# Patient Record
Sex: FEMALE | Race: WHITE | NOT HISPANIC OR LATINO | Employment: OTHER | URBAN - NONMETROPOLITAN AREA
[De-identification: names, ages, dates, MRNs, and addresses within clinical notes are randomized per-mention and may not be internally consistent; named-entity substitution may affect disease eponyms.]

---

## 2017-03-06 ENCOUNTER — HISTORY (OUTPATIENT)
Dept: FAMILY MEDICINE | Facility: OTHER | Age: 47
End: 2017-03-06

## 2017-03-06 ENCOUNTER — OFFICE VISIT - GICH (OUTPATIENT)
Dept: FAMILY MEDICINE | Facility: OTHER | Age: 47
End: 2017-03-06

## 2017-03-06 DIAGNOSIS — L73.9 FOLLICULAR DISORDER: ICD-10-CM

## 2017-03-29 ENCOUNTER — COMMUNICATION - GICH (OUTPATIENT)
Dept: FAMILY MEDICINE | Facility: OTHER | Age: 47
End: 2017-03-29

## 2017-03-29 ENCOUNTER — OFFICE VISIT - GICH (OUTPATIENT)
Dept: FAMILY MEDICINE | Facility: OTHER | Age: 47
End: 2017-03-29

## 2017-03-29 ENCOUNTER — HISTORY (OUTPATIENT)
Dept: FAMILY MEDICINE | Facility: OTHER | Age: 47
End: 2017-03-29

## 2017-03-29 DIAGNOSIS — R21 RASH AND OTHER NONSPECIFIC SKIN ERUPTION: ICD-10-CM

## 2017-04-02 ENCOUNTER — HISTORY (OUTPATIENT)
Dept: FAMILY MEDICINE | Facility: OTHER | Age: 47
End: 2017-04-02

## 2017-05-15 ENCOUNTER — OFFICE VISIT - GICH (OUTPATIENT)
Dept: FAMILY MEDICINE | Facility: OTHER | Age: 47
End: 2017-05-15

## 2017-05-15 ENCOUNTER — HISTORY (OUTPATIENT)
Dept: FAMILY MEDICINE | Facility: OTHER | Age: 47
End: 2017-05-15

## 2017-05-15 DIAGNOSIS — N60.02 SOLITARY CYST OF LEFT BREAST: ICD-10-CM

## 2017-05-23 ENCOUNTER — HOSPITAL ENCOUNTER (OUTPATIENT)
Dept: RADIOLOGY | Facility: OTHER | Age: 47
End: 2017-05-23
Attending: FAMILY MEDICINE

## 2017-05-23 ENCOUNTER — AMBULATORY - GICH (OUTPATIENT)
Dept: SCHEDULING | Facility: OTHER | Age: 47
End: 2017-05-23

## 2017-05-23 DIAGNOSIS — N60.02 SOLITARY CYST OF LEFT BREAST: ICD-10-CM

## 2017-05-26 ENCOUNTER — COMMUNICATION - GICH (OUTPATIENT)
Dept: FAMILY MEDICINE | Facility: OTHER | Age: 47
End: 2017-05-26

## 2018-01-03 NOTE — NURSING NOTE
Patient Information     Patient Name MRN Sex Ailin Kidd [id number] Female 1970      Nursing Note by Danielle Pope at 3/6/2017  1:30 PM     Author:  Danielle Pope Service:  (none) Author Type:  (none)     Filed:  3/6/2017  1:38 PM Encounter Date:  3/6/2017 Status:  Signed     :  Danielle Pope            Patient presents today with a rash on her chest.  Danielle Pope LPN  3/6/2017  1:26 PM

## 2018-01-03 NOTE — PROGRESS NOTES
Patient Information     Patient Name MRN Sex Ailin Kidd [id number] Female 1970      Progress Notes by Damien Salmon MD at 3/6/2017  1:30 PM     Author:  Damien Salmon MD Service:  (none) Author Type:  Physician     Filed:  3/6/2017  2:13 PM Encounter Date:  3/6/2017 Status:  Signed     :  Damien Salmon MD (Physician)            SUBJECTIVE:  Ailin Martinez is a 46 y.o. female who presents for evaluation of a rash on her chest. This started to develop on the return flight following a trip to [place]. Temperatures there were very hot and she was sweating quite a bit while there. Rash started about 2 weeks ago on the upper chest and has migrated down between her breasts. Rash is quite pruritic.     No Known Allergies and   No current outpatient prescriptions on file prior to visit.     No current facility-administered medications on file prior to visit.        OBJECTIVE:  /60  Temp 98  F (36.7  C) (Temporal)  Wt 62.8 kg (138 lb 8 oz)  BMI 21.74 kg/m2  EXAM:  General Appearance: Pleasant, alert, appropriate appearance for age. No acute distress  Skin: Multiple inflamed excoriations noted primarily between the breasts.     ASSESSMENT/Plan :      ICD-10-CM    1. Folliculitis  Findings consistent with dermatitis complicated by secondary bacterial infection. Elected to place on oral cephalexin as well as topical triamcinolone cream. She will follow-up if not improving.  L73.9 cephalexin (KEFLEX) 500 mg capsule      triamcinolone (ARISTOCORT; KENALOG) 0.1 % cream       Damien Salmon MD

## 2018-01-04 NOTE — TELEPHONE ENCOUNTER
Patient Information     Patient Name MRAilin Feliz [id number] Female 1970      Telephone Encounter by Tish Daniels at 3/29/2017  1:45 PM     Author:  Tish Daniels Service:  (none) Author Type:  (none)     Filed:  3/29/2017  1:47 PM Encounter Date:  3/29/2017 Status:  Signed     :  Tish Daniels            Patient states she had a recent infection on her neck and chest.  She was given Keflex and this cleared up and with in the last couple days the Rash has come back on her torso area and it is exactly like her previous rash.  She is wondering if she needs to come in again or if another Prescription  Can be sent in to her pharmacy  Tish Daniels LPN........................3/29/2017  1:47 PM

## 2018-01-04 NOTE — TELEPHONE ENCOUNTER
Patient Information     Patient Name MRN Sex Ailin Kidd [id number] Female 1970      Telephone Encounter by Juan David Wen MD at 3/29/2017  1:50 PM     Author:  Juan David Wen MD Service:  (none) Author Type:  Physician     Filed:  3/29/2017  1:50 PM Encounter Date:  3/29/2017 Status:  Signed     :  Juan David Wen MD (Physician)            Needs to be seen

## 2018-01-04 NOTE — TELEPHONE ENCOUNTER
Patient Information     Patient Name MRN Sex Ailin Kidd [id number] Female 1970      Telephone Encounter by Tish Daniels at 3/29/2017  1:55 PM     Author:  Tish Daniels Service:  (none) Author Type:  (none)     Filed:  3/29/2017  1:55 PM Encounter Date:  3/29/2017 Status:  Signed     :  Tish Daniels            Patient notified transferred to rachelle Daniels LPN........................3/29/2017  1:55 PM

## 2018-01-04 NOTE — NURSING NOTE
Patient Information     Patient Name MRN Sex Ailin Kidd [id number] Female 1970      Nursing Note by Lissa Sotomayor at 3/29/2017  4:00 PM     Author:  Lissa Sotomayor Service:  (none) Author Type:  (none)     Filed:  3/29/2017  4:30 PM Encounter Date:  3/29/2017 Status:  Signed     :  Lissa Sotomayor            Patient here for c/o continued rash. Got bacterial infection; was in [place]. Was originally on chest, now more on abdomen. Given antibiotic and cream. Still using the cream but not doing much. Burning and itching.  Lissa Sotomayor LPN..............................3/29/2017  4:05 PM

## 2018-01-04 NOTE — PROGRESS NOTES
Patient Information     Patient Name MRN Sex Ailin Kidd [id number] Female 1970      Progress Notes by Sandy Munroe MD at 3/29/2017  4:00 PM     Author:  Sandy Munroe MD Service:  (none) Author Type:  Physician     Filed:  4/3/2017 12:09 AM Encounter Date:  3/29/2017 Status:  Signed     :  Sandy Munroe MD (Physician)            SUBJECTIVE:    Ailin Martinez is a 46 y.o. female who presents for evaluation of rash.    Nursing Notes:   Lissa Sotomayor  3/29/2017  4:30 PM  Signed  Patient here for c/o continued rash. Got bacterial infection; was in [place]. Was originally on chest, now more on abdomen. Given antibiotic and cream. Still using the cream but not doing much. Burning and itching.  Lissa Sotomayor LPN..............................3/29/2017  4:05 PM    HPI  Ailin Martinez is a 46 y.o. female in for evaluation of rash.  Started when she was on the plane back from [place].  It started on her upper chest and now is on her abdomen.  She had thought it could be shingles, so came into the clinic.  Diagnosis was possible bacterial skin infection.  Was placed on kelfex tid - symptoms started to improve with taking this.  Also given TMC - this hasn't helped much.  No fevers.  Some itching.  No peeling of her skin.  No one else with similar symptoms.        No Known Allergies,   Current Outpatient Prescriptions     Medication  Sig     triamcinolone (ARISTOCORT; KENALOG) 0.1 % cream Apply  topically to affected area(s) 3 times daily.     No current facility-administered medications for this visit.      Medications have been reviewed by me and are current to the best of my knowledge and ability.  and   Past Medical History:     Diagnosis  Date     Hx of pregnancy      with two vaginal deliveries        REVIEW OF SYSTEMS:  Review of Systems   Constitutional: Negative for chills and fever.   Respiratory: Negative for cough.        OBJECTIVE:  /60  Pulse 72   Wt 62.8 kg (138 lb 6.4 oz)  BMI 21.72 kg/m2    EXAM:   Physical Exam   Constitutional: She is well-developed, well-nourished, and in no distress.   Skin:   Erythematous flat macules with thin scale, no vesicles across her abdomen - none on her arms/legs or back   Nursing note and vitals reviewed.      ASSESSMENT/PLAN:    ICD-10-CM    1. Rash R21 trimethoprim-sulfamethoxazole, 160-800 mg, (BACTRIM DS, SEPTRA DS) tablet        Plan:  1.  Uncertain if rash is contact infection, fungal, or bacterial.  She did have some response to keflex, so will restart treatment with bactrim for 10 days - if not improving consider use of antifungal.  It is possible that her rash is pityriasis, possible that is not related to recent travel.  Follow up if not improving.  Sandy Munroe MD

## 2018-01-05 NOTE — TELEPHONE ENCOUNTER
Patient Information     Patient Name MRN Ailin Gotti [id number] Female 1970      Telephone Encounter by Deandra Bonner at 2017  2:22 PM     Author:  Deandra Bonner Service:  (none) Author Type:  (none)     Filed:  2017  2:27 PM Encounter Date:  2017 Status:  Signed     :  Deandra Bonner            Patient calling in regards to results from aspiration of breast cysts. She would like another provider to look at them so she doesn't have to wait over the long weekend.    Deandra Bonner LPN...................2017  2:27 PM

## 2018-01-05 NOTE — MISCELLANEOUS
Patient Information     Patient Name MRN Sex Ailin Kidd [id number] Female 1970      Protocol by Evelina Bynum RN at 2017  1:46 PM     Author:  Evelina Bynum RN Service:  (none) Author Type:  NURS- Registered Nurse     Filed:  2017  1:48 PM Date of Service:  2017  1:46 PM Status:  Signed     :  Evelina Bynum RN (NURS- Registered Nurse)            Universal Protocol-1346    A. Pre-procedure verification complete yes  1-relevant information / documentation available, reviewed and properly matched to the patient; 2-consent accurate and complete, 3-equipment and supplies available    B. Site marking complete Yes  Site marked if not in continuous attendance with patient    C. TIME OUT completed yes  Time Out was conducted just prior to starting procedure to verify the eight required elements: 1-patient identity, 2-consent accurate and complete, 3-position, 4-correct side/site marked (if applicable), 5-procedure, 6-relevant images / results properly labeled and displayed (if applicable), 7-antibiotics / irrigation fluids (if applicable), 8-safety precautions.

## 2018-01-05 NOTE — PROGRESS NOTES
Patient Information     Patient Name MRN Sex Ailin Kidd [id number] Female 1970      Progress Notes by Evelina Bynum RN at 2017  2:15 PM     Author:  Evelina Bynum RN Service:  (none) Author Type:  NURS- Registered Nurse     Filed:  2017  2:26 PM Date of Service:  2017  2:15 PM Status:  Signed     :  Evelina Bynum RN (NURS- Registered Nurse)            Discharge Note    Data:  Ailin Martinez has been discharged home at 1415 via ambulatory accompanied by Registered Nurse.      Action:  Written discharge/follow-up instructions were provided to patient. Prescriptions : None.  Belongings sent with patient. Medications from home sent with patient/family: Not Applicable  Equipment none .     Response:  Patient verbalized understanding of discharge instructions, reason for discharge, and necessary follow-up appointments.

## 2018-01-05 NOTE — TELEPHONE ENCOUNTER
Patient Information     Patient Name MRN Sex Ailin Kidd [id number] Female 1970      Telephone Encounter by Deandra Bonner at 2017  2:44 PM     Author:  Deandra Bonner Service:  (none) Author Type:  (none)     Filed:  2017  2:45 PM Encounter Date:  2017 Status:  Signed     :  Deandra Bonner            Spoke with patient and notified her that results were normal. She was relieved.    Deandra Bonner LPN...................2017  2:45 PM

## 2018-01-05 NOTE — TELEPHONE ENCOUNTER
Patient Information     Patient Name MRN Ailin Gotti [id number] Female 1970      Telephone Encounter by Juan David Wen MD at 2017  2:36 PM     Author:  Juan David Wen MD Service:  (none) Author Type:  Physician     Filed:  2017  2:37 PM Encounter Date:  2017 Status:  Signed     :  Juan David Wen MD (Physician)            Will see patient today

## 2018-01-05 NOTE — PROGRESS NOTES
"Patient Information     Patient Name MRN Sex Ailin Kidd [id number] Female 1970      Progress Notes by Damien Salmon MD at 5/15/2017  2:45 PM     Author:  Damien Salmon MD Service:  (none) Author Type:  Physician     Filed:  5/15/2017  4:03 PM Encounter Date:  5/15/2017 Status:  Signed     :  Damien Salmon MD (Physician)            SUBJECTIVE:  Ailin Martinez is a 46 y.o. female who presents with a painful lump in the lateral aspect of her left breast that has been present for a couple of weeks. She had a mammogram and bilateral breast ultrasound late last year for right breast pain with fibrocystic changes and bilateral cysts noted at that time. 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    No Known Allergies and   No current outpatient prescriptions on file prior to visit.     No current facility-administered medications on file prior to visit.        OBJECTIVE:  /68  Temp 98.2  F (36.8  C) (Temporal)  Ht 1.708 m (5' 7.25\")  Wt 61.7 kg (136 lb)  BMI 21.14 kg/m2  EXAM:  General Appearance: Pleasant, alert, appropriate appearance for age. No acute distress  Breast Exam: Visible lump noted in the left breast. Breast exam reveals fibrocystic changes in the right breast laterally and a complex cyst like mass in the lateral aspect of the left breast measuring 3-4 cm in maximal diameter. This region is tender to touch. No palpable axillary adenopathy.    ASSESSMENT/Plan :      ICD-10-CM    1. Breast cyst, left  Will refer to radiology for ultrasound-guided left breast cyst aspiration.  N60.02 US BREAST CYST ASPIRATION       Damien Salmon MD          "

## 2018-01-05 NOTE — NURSING NOTE
Patient Information     Patient Name MRN Sex Ailin Kidd [id number] Female 1970      Nursing Note by Danielle Pope at 5/15/2017  2:45 PM     Author:  Danielle Pope Service:  (none) Author Type:  (none)     Filed:  5/15/2017  3:03 PM Encounter Date:  5/15/2017 Status:  Signed     :  Danielle Pope            Patient presents today as a follow-up on breast cysts.  She states that the left side of her breast, the cysts are getting bigger and they are painful.  Danielle Pope LPN  5/15/2017  2:58 PM

## 2018-01-05 NOTE — PROGRESS NOTES
Patient Information     Patient Name MRN Sex Ailin Kidd [id number] Female 1970      Progress Notes by Evelina Bynum RN at 2017  2:05 PM     Author:  Evelina Bynum RN  Service:  (none) Author Type:  NURS- Registered Nurse     Filed:  2017  2:27 PM  Date of Service:  2017  2:05 PM Status:  Addendum     :  Evelina Bynum RN (NURS- Registered Nurse)        Related Notes: Original Note by Evelina Bynum RN (NURS- Registered Nurse) filed at 2017  2:25 PM            Pressure held on aspiration site for 5 minutes. Sterile 2x2 placed over insertion site and covered with a sterile tegaderm.      Sports bra and small ice pad in place over dressing.    Aspiration fluid sent to lab for analysis.  Patient will contact PMD if she does not hear from them in 3 days.

## 2018-01-26 VITALS
TEMPERATURE: 98 F | WEIGHT: 138.5 LBS | BODY MASS INDEX: 21.74 KG/M2 | DIASTOLIC BLOOD PRESSURE: 60 MMHG | SYSTOLIC BLOOD PRESSURE: 110 MMHG

## 2018-01-26 VITALS
DIASTOLIC BLOOD PRESSURE: 68 MMHG | TEMPERATURE: 98.2 F | BODY MASS INDEX: 21.35 KG/M2 | WEIGHT: 136 LBS | SYSTOLIC BLOOD PRESSURE: 120 MMHG | HEIGHT: 67 IN

## 2018-01-26 VITALS
DIASTOLIC BLOOD PRESSURE: 60 MMHG | HEART RATE: 72 BPM | SYSTOLIC BLOOD PRESSURE: 100 MMHG | BODY MASS INDEX: 21.68 KG/M2 | WEIGHT: 138.4 LBS

## 2018-02-19 ENCOUNTER — DOCUMENTATION ONLY (OUTPATIENT)
Dept: FAMILY MEDICINE | Facility: OTHER | Age: 48
End: 2018-02-19

## 2018-02-19 PROBLEM — T78.40XA ALLERGIC STATE: Status: ACTIVE | Noted: 2018-02-19

## 2018-02-19 PROBLEM — N94.6 DYSMENORRHEA: Status: ACTIVE | Noted: 2018-02-19

## 2018-02-20 ENCOUNTER — OFFICE VISIT (OUTPATIENT)
Dept: FAMILY MEDICINE | Facility: OTHER | Age: 48
End: 2018-02-20
Attending: FAMILY MEDICINE
Payer: COMMERCIAL

## 2018-02-20 VITALS
HEIGHT: 67 IN | HEART RATE: 80 BPM | DIASTOLIC BLOOD PRESSURE: 74 MMHG | BODY MASS INDEX: 20.21 KG/M2 | WEIGHT: 128.8 LBS | SYSTOLIC BLOOD PRESSURE: 106 MMHG

## 2018-02-20 DIAGNOSIS — N60.02 BREAST CYST, LEFT: Primary | ICD-10-CM

## 2018-02-20 DIAGNOSIS — N64.4 MASTODYNIA: ICD-10-CM

## 2018-02-20 PROBLEM — N94.6 DYSMENORRHEA: Status: RESOLVED | Noted: 2018-02-19 | Resolved: 2018-02-20

## 2018-02-20 PROCEDURE — 99213 OFFICE O/P EST LOW 20 MIN: CPT | Performed by: FAMILY MEDICINE

## 2018-02-20 ASSESSMENT — PAIN SCALES - GENERAL: PAINLEVEL: MILD PAIN (2)

## 2018-02-20 NOTE — MR AVS SNAPSHOT
"              After Visit Summary   2/20/2018    Ailin Martinez    MRN: [id number]           Patient Information     Date Of Birth          1970        Visit Information        Provider Department      2/20/2018 7:45 AM Steve Chen MD [facility]        Today's Diagnoses     Breast cyst, left    -  1    Mastodynia           Follow-ups after your visit        Future tests that were ordered for you today     Open Future Orders        Priority Expected Expires Ordered    US Breast Left Complete 4 Quadrants Routine  2/20/2019 2/20/2018    US Breast Cyst Aspiration ([facility]) Routine  2/20/2019 2/20/2018    MA Diagnostic Digital Bilateral Routine  2/20/2019 2/20/2018            Who to contact     If you have questions or need follow up information about today's clinic visit or your schedule please contact [facility] directly at [phone].  Normal or non-critical lab and imaging results will be communicated to you by MyChart, letter or phone within 4 business days after the clinic has received the results. If you do not hear from us within 7 days, please contact the clinic through MyChart or phone. If you have a critical or abnormal lab result, we will notify you by phone as soon as possible.  Submit refill requests through [employer] or call your pharmacy and they will forward the refill request to us. Please allow 3 business days for your refill to be completed.          Additional Information About Your Visit        MyChart Information     [employer] lets you send messages to your doctor, view your test results, renew your prescriptions, schedule appointments and more. To sign up, go to [email or web address] . Click on \"Log in\" on the left side of the screen, which will take you to the Welcome page. Then click on \"Sign up Now\" on the right side of the page.     You will be asked to enter the access code listed below, as well as some personal information. Please follow the " "directions to create your username and password.     Your access code is: [email or web address]  Expires: 2018  8:26 AM     Your access code will  in 90 days. If you need help or a new code, please call your [facility] clinic or [phone].        Care EveryWhere ID     This is your Care EveryWhere ID. This could be used by other organizations to access your [facility] medical records  [id number]        Your Vitals Were     Pulse Height Breastfeeding? BMI (Body Mass Index)          80 5' 7\" (1.702 m) No 20.17 kg/m2         Blood Pressure from Last 3 Encounters:   18 106/74   05/15/17 120/68   17 100/60    Weight from Last 3 Encounters:   18 128 lb 12.8 oz (58.4 kg)   05/15/17 136 lb (61.7 kg)   17 138 lb 6.4 oz (62.8 kg)               Primary Care Provider Fax #    Physician No Ref-Primary [phone]       No address on file        Equal Access to Services     [facility]: Hadii aad ku hadasho Sodouglas, waaxda luqadaha, qaybta kaalmada adestephanie, jeff renee . So [facility] [phone].    ATENCIÓN: Si habla español, tiene a cedeño disposición servicios gratuitos de asistencia lingüística. Llame al [phone].    We comply with applicable federal civil rights laws and [place] laws. We do not discriminate on the basis of race, color, national origin, age, disability, sex, sexual orientation, or gender identity.            Thank you!     Thank you for choosing [facility] AND [facility]  for your care. Our goal is always to provide you with excellent care. Hearing back from our patients is one way we can continue to improve our services. Please take a few minutes to complete the written survey that you may receive in the mail after your visit with us. Thank you!             Your Updated Medication List - Protect others around you: Learn how to safely use, store and throw away your medicines at www.disposemymeds.org.      Notice  As of 2018  8:26 AM    You " have not been prescribed any medications.

## 2018-02-20 NOTE — PROGRESS NOTES
Nursing Notes:   Berenice Whitman LPN  2018  7:54 AM  Signed  She has a new lump in her left breast and a lot of left breast pain.  Berenice Whitman LPN..................2018   7:49 AM      SUBJECTIVE:  Ailin Martinez  is a 47 year old female who comes in today for evaluation of a lump in her left breast abdomen increased amount of left breast pain.  She has a history in the past fibrocystic changes and saw Dr. Salmon back in May of last year with similar symptoms.  She was referred for ultrasound-guided breast cyst aspiration.  Pathology was normal.  There was a cyst on the left under the nipple that she could not get at. She is having pain in her left armpit.  She gets sore from rubbing.    She drinks some coffee, not much pop.  No fever, chills.     Past Medical, Family, and Social History reviewed and updated as noted below.   ROS is negative except as noted above       No Known Allergies,   Family History   Problem Relation Age of Onset     Family History Negative Mother      Good Health     Family History Negative Father      Good Health     Breast Cancer No family hx of      Cancer-breast   ,   No current outpatient prescriptions on file.     No current facility-administered medications for this visit.    ,   Past Medical History:   Diagnosis Date     Personal history of other medical treatment (CODE)      with two vaginal deliveries   ,   Patient Active Problem List    Diagnosis Date Noted     Allergic state 2018     Priority: Medium     Onychomycosis 2016     Priority: Medium   ,   Past Surgical History:   Procedure Laterality Date     EXTRACTION(S) DENTAL      No Comments Provided     LAPAROSCOPIC ABLATION ENDOMETRIOSIS      ,Endometrial Ablation     OTHER SURGICAL HISTORY      ,,OTHER    and   Social History   Substance Use Topics     Smoking status: Never Smoker     Smokeless tobacco: Never Used     Alcohol use No     OBJECTIVE:  /74 (BP Location:  "Right arm, Patient Position: Sitting, Cuff Size: Adult Regular)  Pulse 80  Ht 5' 7\" (1.702 m)  Wt 128 lb 12.8 oz (58.4 kg)  Breastfeeding? No  BMI 20.17 kg/m2   EXAM:  Alert and cooperative, no distress.  Thin white female.  Examination of right breast reveals fibrocystic changes but no suspicious masses or tenderness noted.  No axillary nodes.  Examination of the left breast reveals a slightly tender blueberry sized cyst at about 2:00 that is mobile.  There is some stranding in fibrous tissue that heads towards the axilla along with same line.  It is slightly tender to palpation there.  There is no redness or swelling.  No palpable nodes.  ASSESSMENT/Plan :    Ailin was seen today for mass.    Diagnoses and all orders for this visit:    Breast cyst, left  -     MA Diagnostic Digital Bilateral; Future  -     US Breast Left Complete 4 Quadrants; Future  -     US Breast Cyst Aspiration ([facility]); Future    Mastodynia  -     MA Diagnostic Digital Bilateral; Future  -     US Breast Left Complete 4 Quadrants; Future  -     US Breast Cyst Aspiration ([facility]); Future      Referred for diagnostic mammogram and ultrasound.  Also referred for breast cyst aspiration because of the symptomatic.  Encouraged limitation of caffeine intake.    Steve Chen MD              "

## 2018-02-20 NOTE — NURSING NOTE
She has a new lump in her left breast and a lot of left breast pain.  Berenice Whitman LPN..................2/20/2018   7:49 AM

## 2018-02-21 ASSESSMENT — PATIENT HEALTH QUESTIONNAIRE - PHQ9: SUM OF ALL RESPONSES TO PHQ QUESTIONS 1-9: 2

## 2018-02-27 ENCOUNTER — HOSPITAL ENCOUNTER (OUTPATIENT)
Dept: ULTRASOUND IMAGING | Facility: OTHER | Age: 48
Discharge: HOME | End: 2018-02-27
Attending: FAMILY MEDICINE | Admitting: FAMILY MEDICINE
Payer: COMMERCIAL

## 2018-02-27 ENCOUNTER — HOSPITAL ENCOUNTER (OUTPATIENT)
Dept: MAMMOGRAPHY | Facility: OTHER | Age: 48
End: 2018-02-27
Attending: FAMILY MEDICINE
Payer: COMMERCIAL

## 2018-02-27 DIAGNOSIS — N60.02 BREAST CYST, LEFT: ICD-10-CM

## 2018-02-27 DIAGNOSIS — N64.4 MASTODYNIA: ICD-10-CM

## 2018-02-27 PROCEDURE — 76642 ULTRASOUND BREAST LIMITED: CPT | Mod: LT

## 2018-02-27 PROCEDURE — 77066 DX MAMMO INCL CAD BI: CPT

## 2018-03-07 ENCOUNTER — HOSPITAL ENCOUNTER (OUTPATIENT)
Dept: ULTRASOUND IMAGING | Facility: OTHER | Age: 48
Discharge: HOME | End: 2018-03-07
Attending: FAMILY MEDICINE | Admitting: FAMILY MEDICINE
Payer: COMMERCIAL

## 2018-03-07 VITALS
HEART RATE: 95 BPM | SYSTOLIC BLOOD PRESSURE: 107 MMHG | OXYGEN SATURATION: 98 % | RESPIRATION RATE: 16 BRPM | DIASTOLIC BLOOD PRESSURE: 82 MMHG | TEMPERATURE: 99.2 F

## 2018-03-07 DIAGNOSIS — N60.02 BREAST CYST, LEFT: ICD-10-CM

## 2018-03-07 DIAGNOSIS — N64.4 MASTODYNIA: ICD-10-CM

## 2018-03-07 PROCEDURE — 19000 PUNCTURE ASPIR CYST BREAST: CPT

## 2018-03-07 PROCEDURE — 25000125 ZZHC RX 250: Performed by: RADIOLOGY

## 2018-03-07 RX ADMIN — LIDOCAINE HYDROCHLORIDE 10 ML: 10 INJECTION, SOLUTION INFILTRATION; PERINEURAL at 12:08

## 2018-03-07 NOTE — DISCHARGE INSTRUCTIONS
"NEEDLE BIOPSY BREAST    Activity: Rest the remainder of the day. You may resume normal activity after the next day. Avoid any vigorous/strenuous physical activity for 24 hours.    Comfort: If you have discomfort or tenderness at the site you may take your usual or recommended pain medication. Do not take aspirin the day of the procedure or for 48 hours following the biopsy.    Diet: You may resume your usual diet.    Care of site: Leave ice pack in place for 4 hours, or until it is no longer cold. The ice pack is reusable and may be refrozen.  Keep your bra and the dressing on for 24 hours. Then you may remove the bandage and shower. If there are steri-strips you may remove them in 3 to 5 days.  You may have some discomfort and a small amount of bruising where the biopsy was performed. This is normal. For several days or even a couple of weeks, you may have tenderness or \"twinges\" and a tiny bump where the needle went into the skin. This can be bothersome, but is not abnormal. You can use warm moist washcloths, as this may help. Do Not Use A Heating Pad.    RETURN TO THE EMERGENCY ROOM FOR:   Shortness of breath   Rapid heart rate   If pain becomes worse    Call Your Doctor For:    A fever over 101 degrees   Increased redness, increased swelling, and/or persistent drainage/discomfort  around the site    Other: At the end of your breast biopsy, a tiny titanium clip will be inserted through the biopsy needle and placed at the biopsy site within your breast. The marker provides a landmark of the biopsy for further mammograms or surgical procedures. This marker is MRI compatible and poses no known health risks.    You will be receiving a letter in the mail from [facility] Department with your biopsy results.  In 6 months a mammogram will be needed to establish a new baseline and to recheck the area where the biopsy occurred. Our radiology department will call you to schedule an appointment.    For " questions, problems or concerns, contact the Radiology Department at [phone].

## 2018-03-07 NOTE — IP AVS SNAPSHOT
"                  MRN:[id number]                      After Visit Summary   3/7/2018    Ailin Martinez    MRN: [id number]           Visit Information        Provider Department      3/7/2018 11:30 AM EVETTERAD1;  IMAGING NURSE; [facility]           Review of your medicines      Notice     You have not been prescribed any medications.             Protect others around you: Learn how to safely use, store and throw away your medicines at www.disposemymeds.org.         Follow-ups after your visit         Care Instructions        Further instructions from your care team       NEEDLE BIOPSY BREAST    Activity: Rest the remainder of the day. You may resume normal activity after the next day. Avoid any vigorous/strenuous physical activity for 24 hours.    Comfort: If you have discomfort or tenderness at the site you may take your usual or recommended pain medication. Do not take aspirin the day of the procedure or for 48 hours following the biopsy.    Diet: You may resume your usual diet.    Care of site: Leave ice pack in place for 4 hours, or until it is no longer cold. The ice pack is reusable and may be refrozen.  Keep your bra and the dressing on for 24 hours. Then you may remove the bandage and shower. If there are steri-strips you may remove them in 3 to 5 days.  You may have some discomfort and a small amount of bruising where the biopsy was performed. This is normal. For several days or even a couple of weeks, you may have tenderness or \"twinges\" and a tiny bump where the needle went into the skin. This can be bothersome, but is not abnormal. You can use warm moist washcloths, as this may help. Do Not Use A Heating Pad.    RETURN TO THE EMERGENCY ROOM FOR:   Shortness of breath   Rapid heart rate   If pain becomes worse    Call Your Doctor For:    A fever over 101 degrees   Increased redness, increased swelling, and/or persistent drainage/discomfort  around the site    Other: At the end of " "your breast biopsy, a tiny titanium clip will be inserted through the biopsy needle and placed at the biopsy site within your breast. The marker provides a landmark of the biopsy for further mammograms or surgical procedures. This marker is MRI compatible and poses no known health risks.    You will be receiving a letter in the mail from [facility] Mammography Department with your biopsy results.  In 6 months a mammogram will be needed to establish a new baseline and to recheck the area where the biopsy occurred. Our radiology department will call you to schedule an appointment.    For questions, problems or concerns, contact the Radiology Department at [phone].         Additional Information About Your Visit        [address] [facility] Information     [employer] lets you send messages to your doctor, view your test results, renew your prescriptions, schedule appointments and more. To sign up, go to [email or web address] . Click on \"Log in\" on the left side of the screen, which will take you to the Welcome page. Then click on \"Sign up Now\" on the right side of the page.     You will be asked to enter the access code listed below, as well as some personal information. Please follow the directions to create your username and password.     Your access code is: [email or web address]  Expires: 2018  8:26 AM     Your access code will  in 90 days. If you need help or a new code, please call your [place] clinic or [phone].        Care EveryWhere ID     This is your Care EveryWhere ID. This could be used by other organizations to access your [place] medical records  [id number]        Your Vitals Were     Blood Pressure Pulse Temperature Pulse Oximetry          115/75 (BP Location: Left arm, Cuff Size: Adult Regular) 95 99.2  F (37.3  C) (Tympanic) 99%         Primary Care Provider Fax #    Physician No Ref-Primary [phone]      Equal Access to Services     WHITLEY MAYORGA : ligia Law, " jeff gonzalez dbarchana linotoniel renee ah. So [facility] [phone].    ATENCIÓN: Si habla han, tiene a cedeño disposición servicios gratuitos de asistencia lingüística. Llame al [phone].    We comply with applicable federal civil rights laws and [place] laws. We do not discriminate on the basis of race, color, national origin, age, disability, sex, sexual orientation, or gender identity.            Thank you!     Thank you for choosing [facility] for your care. Our goal is always to provide you with excellent care. Hearing back from our patients is one way we can continue to improve our services. Please take a few minutes to complete the written survey that you may receive in the mail after you visit with us. Thank you!             Medication List: This is a list of all your medications and when to take them. Check marks below indicate your daily home schedule. Keep this list as a reference.      Notice     You have not been prescribed any medications.

## 2018-03-07 NOTE — PROGRESS NOTES
Pressure held on biopsy site for 5 minutes. Sterile 2x2 placed over insertion site and covered with a sterile tegaderm.    Patient brought to mamms for post clip mammogram:  no    Sports bra and small ice pad in place over dressing.    Patient discharged at this time. Instructions reviewed, patient voices understanding.

## 2018-03-07 NOTE — IP AVS SNAPSHOT
[facility]    [address]    [place] [address]    Phone:  [phone]    Fax:  [phone]                                       After Visit Summary   3/7/2018    Ailin Martinez    MRN: [id number]           After Visit Summary Signature Page     I have received my discharge instructions, and my questions have been answered. I have discussed any challenges I see with this plan with the nurse or doctor.    ..........................................................................................................................................  Patient/Patient Representative Signature      ..........................................................................................................................................  Patient Representative Print Name and Relationship to Patient    ..................................................               ................................................  Date                                            Time    ..........................................................................................................................................  Reviewed by Signature/Title    ...................................................              ..............................................  Date                                                            Time

## 2018-06-25 ENCOUNTER — OFFICE VISIT (OUTPATIENT)
Dept: FAMILY MEDICINE | Facility: OTHER | Age: 48
End: 2018-06-25
Attending: NURSE PRACTITIONER
Payer: COMMERCIAL

## 2018-06-25 VITALS
BODY MASS INDEX: 20 KG/M2 | DIASTOLIC BLOOD PRESSURE: 76 MMHG | TEMPERATURE: 99.3 F | HEART RATE: 72 BPM | SYSTOLIC BLOOD PRESSURE: 108 MMHG | HEIGHT: 67 IN | WEIGHT: 127.44 LBS

## 2018-06-25 DIAGNOSIS — S46.211A BICEPS TENDON RUPTURE, PROXIMAL, RIGHT, INITIAL ENCOUNTER: Primary | ICD-10-CM

## 2018-06-25 PROCEDURE — 99213 OFFICE O/P EST LOW 20 MIN: CPT | Performed by: NURSE PRACTITIONER

## 2018-06-25 RX ORDER — TRAMADOL HYDROCHLORIDE 50 MG/1
50 TABLET ORAL EVERY 6 HOURS PRN
Qty: 10 TABLET | Refills: 0 | Status: SHIPPED | OUTPATIENT
Start: 2018-06-25 | End: 2022-11-01

## 2018-06-25 RX ORDER — IBUPROFEN 600 MG/1
600 TABLET, FILM COATED ORAL EVERY 8 HOURS PRN
Qty: 45 TABLET | Refills: 0 | Status: SHIPPED | OUTPATIENT
Start: 2018-06-25 | End: 2022-11-01

## 2018-06-25 ASSESSMENT — PAIN SCALES - GENERAL: PAINLEVEL: MODERATE PAIN (4)

## 2018-06-25 NOTE — PROGRESS NOTES
Nursing Notes:   Niya Garcia CMA  6/25/2018 11:45 AM  Unsigned  Patient presents to the clinic for right arm pain that started this morning. 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 She reports no known injury and says the area is now swollen and bruised. She has not taken or done anything for treatment.  Niya CORONEL CMA.......6/25/2018..11:44 AM      SUBJECTIVE:   Ailin Martinez is a 47 year old female who presents to clinic today for the following health issues:    Musculoskeletal problem/pain      Duration: woke today with bruise and irritation, did have pain in upper arm last night.     Description  Location: RT upper arm    Intensity:  moderate    Accompanying signs and symptoms: weakness of RT arm, swelling and discoloration of biceps area    History  Previous similar problem: no   Previous evaluation:  none    Precipitating or alleviating factors:  Trauma or overuse: YES-feels she may be overusing her arm as the last 6 weeks she has been painting her her father's house.  She is getting it ready to sell.  So there has been a lot of cleaning overhead use of her arm.  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  Aggravating factors include: lifting    Therapies tried and outcome: rest/inactivity        Problem list and histories reviewed & adjusted, as indicated.  Additional history: as documented    Current Outpatient Prescriptions   Medication Sig Dispense Refill     ibuprofen (ADVIL/MOTRIN) 600 MG tablet Take 1 tablet (600 mg) by mouth every 8 hours as needed for moderate pain 45 tablet 0     order for DME Equipment being ordered: Sling 1 Device 0     traMADol (ULTRAM) 50 MG tablet Take 1 tablet (50 mg) by mouth every 6 hours as needed for severe pain 10 tablet 0     No Known Allergies      ROS:  Notable findings in the HPI.       OBJECTIVE:     /76 (BP Location: Left arm, Patient Position: Sitting, Cuff Size: Adult Regular)  Pulse 72  Temp 99.3  F (37.4  C)  "(Tympanic)  Ht 5' 7\" (1.702 m)  Wt 127 lb 7 oz (57.8 kg)  Breastfeeding? No  BMI 19.96 kg/m2  Body mass index is 19.96 kg/(m^2).  GENERAL: healthy, alert and no distress  MS: RUE exam shows right shoulder with pain at high arc of abduction, negative empty can test, exquisite tenderness over biceps tendon insertion.  Range of motion of elbow intact however is painful for her to do it.  Biceps tendon insertion at the elbow appears intact with a negative hook test.  She does have a bunching of the biceps muscle towards the distal and, and significant bruising around the distal end of the biceps.  She is able to pronate and supinate her forearm without pain.  SKIN: no suspicious lesions or rashes    Diagnostic Test Results:  none     ASSESSMENT/PLAN:     1. Biceps tendon rupture, proximal, right, initial encounter  - ORTHOPEDICS ADULT REFERRAL  - traMADol (ULTRAM) 50 MG tablet; Take 1 tablet (50 mg) by mouth every 6 hours as needed for severe pain  Dispense: 10 tablet; Refill: 0  - ibuprofen (ADVIL/MOTRIN) 600 MG tablet; Take 1 tablet (600 mg) by mouth every 8 hours as needed for moderate pain  Dispense: 45 tablet; Refill: 0  - order for DME; Equipment being ordered: Sling  Dispense: 1 Device; Refill: 0    Medical Decision Making:    Differential Diagnosis:  MS Injury Pain: tendonitis, muscle strain, contusion and distal biceps tendon rupture.    Serious Comorbid Conditions:  Adult:  None    PLAN:    MS Injury/Pain  ice, elevate, rest, Tylenol, Ibuprofen and Rx: Did give her a small prescription for Ultram just for the next few days, she is worried about nausea so she may not take these.  Also gave her prescription for ibuprofen for inflammation.  Given her age and injury we will refer to orthopedics.  I did discuss with her that the proximal tendon rupture is better than the distal tendon rupture.  Given her exam, history, repetitive use I suspect that this is a proximal tendon rupture.  We discussed that these do " not reattach but the pain does get better and use of her arm will get better.    Followup:    If not improving or if condition worsens, follow up with your Primary Care Provider    Disclaimer:  This note consists of words and symbols derived from keyboarding, dictation, or using voice recognition software. As a result, there may be errors in the script that have gone undetected. Please consider this when interpreting information found in this note.      Katherine Hidalgo NP, 6/25/2018 11:59 AM

## 2018-06-25 NOTE — MR AVS SNAPSHOT
After Visit Summary   6/25/2018    Ailin aMrtinez    MRN: [id number]           Patient Information     Date Of Birth          1970        Visit Information        Provider Department      6/25/2018 11:15 AM Katherine Hidalgo NP [facility]        Today's Diagnoses     Biceps tendon rupture, proximal, right, initial encounter    -  1      Care Instructions    See Up to date print out.           Follow-ups after your visit        Additional Services     ORTHOPEDICS ADULT REFERRAL       Your provider has referred you to: G[facility]: [facility] [phone] [email or web address]    Please be aware that coverage of these services is subject to the terms and limitations of your health insurance plan.  Call member services at your health plan with any benefit or coverage questions.      Please bring the following to your appointment:    >>   Any x-rays, CTs or MRIs which have been performed.  Contact the facility where they were done to arrange for  prior to your scheduled appointment.    >>   List of current medications   >>   This referral request   >>   Any documents/labs given to you for this referral                  Your next 10 appointments already scheduled     Jun 26, 2018  3:00 PM CDT   Office Visit with LOUIS Barriga CNP   [facility]    [address]  [facility] [place] [address] [phone]           Bring a current list of meds and any records pertaining to this visit. For Physicals, please bring immunization records and any forms needing to be filled out. Please arrive 10 minutes early to complete paperwork.              Who to contact     If you have questions or need follow up information about today's clinic visit or your schedule please contact [facility] [place] [facility] directly at [phone].  Normal or non-critical lab and imaging results  "will be communicated to you by MyChart, letter or phone within 4 business days after the clinic has received the results. If you do not hear from us within 7 days, please contact the clinic through [employer] or phone. If you have a critical or abnormal lab result, we will notify you by phone as soon as possible.  Submit refill requests through [employer] or call your pharmacy and they will forward the refill request to us. Please allow 3 business days for your refill to be completed.          Additional Information About Your Visit        [employer] Information     [employer] lets you send messages to your doctor, view your test results, renew your prescriptions, schedule appointments and more. To sign up, go to [email or web address] . Click on \"Log in\" on the left side of the screen, which will take you to the Welcome page. Then click on \"Sign up Now\" on the right side of the page.     You will be asked to enter the access code listed below, as well as some personal information. Please follow the directions to create your username and password.     Your access code is: [email or web address]  Expires: 2018 12:36 PM     Your access code will  in 90 days. If you need help or a new code, please call your [place] clinic or [phone].        Care EveryWhere ID     This is your Care EveryWhere ID. This could be used by other organizations to access your [place] medical records  [id number]        Your Vitals Were     Pulse Temperature Height Breastfeeding? BMI (Body Mass Index)       72 99.3  F (37.4  C) (Tympanic) 5' 7\" (1.702 m) No 19.96 kg/m2        Blood Pressure from Last 3 Encounters:   18 108/76   18 107/82   18 106/74    Weight from Last 3 Encounters:   18 127 lb 7 oz (57.8 kg)   18 128 lb 12.8 oz (58.4 kg)   05/15/17 136 lb (61.7 kg)              We Performed the Following     ORTHOPEDICS ADULT REFERRAL        Primary Care Provider Fax #    Physician No Ref-Primary [phone]       " No address on file        Equal Access to Services     [facility] MUKESH : Hadii aad ku hadradhaleona Kelledouglas, landonjoceline astorgatrellha, danielace bakereltonjoceline patel, jeff harrell. So [facility] [phone].    ATENCIÓN: Si habla español, tiene a cedeño disposición servicios gratuitos de asistencia lingüística. Llame al [phone].    We comply with applicable federal civil rights laws and [place] laws. We do not discriminate on the basis of race, color, national origin, age, disability, sex, sexual orientation, or gender identity.            Thank you!     Thank you for choosing [facility]  for your care. Our goal is always to provide you with excellent care. Hearing back from our patients is one way we can continue to improve our services. Please take a few minutes to complete the written survey that you may receive in the mail after your visit with us. Thank you!             Your Updated Medication List - Protect others around you: Learn how to safely use, store and throw away your medicines at www.disposemymeds.org.      Notice  As of 6/25/2018 12:36 PM    You have not been prescribed any medications.

## 2018-06-25 NOTE — NURSING NOTE
Patient presents to the clinic for right arm pain that started this morning. 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 She reports no known injury and says the area is now swollen and bruised. She has not taken or done anything for treatment.  Niya CORONEL CMA.......6/25/2018..11:44 AM

## 2018-06-28 ENCOUNTER — HOSPITAL ENCOUNTER (OUTPATIENT)
Dept: MRI IMAGING | Facility: OTHER | Age: 48
Discharge: HOME | End: 2018-06-28
Payer: COMMERCIAL

## 2018-06-28 DIAGNOSIS — S46.219A BICEPS TENDON TEAR: ICD-10-CM

## 2018-06-28 PROCEDURE — 73221 MRI JOINT UPR EXTREM W/O DYE: CPT | Mod: RT

## 2018-07-24 NOTE — PROGRESS NOTES
"Patient Information     Patient Name  Ailin Martinez MRN  [id number] Sex  Female   1970      Letter by Damien Salmon MD at      Author:  Damien Salmon MD Service:  (none) Author Type:  (none)    Filed:   Encounter Date:  5/15/2017 Status:  (Other)           Ailin Martinez  [address]  [place] [address]          May 15, 2017    Dear Ms. Martinez:    Welcome to [employer]!   Remember to activate your account quickly -  Your activation code expires in 45 days!    With [employer], you can view your health information, email your provider, schedule clinic appointments, get after visit instructions and more - online, anytime.     To activate your [employer] account, you will need:     to visit [email or web address]    your [employer] activation code: [email or web address]    Expires: 2017  3:01 PM     the last four digits of your Social Security number (SSN)     your date of birth.     Step-by-step instructions on how to set up your [employer] account are shown on the following page. If you requested access to your child/dependent s [employer] account or you have been granted access to another adult s [employer] account, instructions for viewing their information are also on the following page.     For questions or technical assistance, call [phone].    Thank you for choosing [employer] activation instructions     Activation code: [email or web address]  Expires: 2017  3:01 PM     Step 1: Go to [email or web address]     Step 2: Click on Enter your activation code.     Step 3: Type in your activation code (provided above), the last four digits of your Social Security number (SSN) and date of birth. This information is only required once. The next time you sign in to your account you will only need your username and password. If you receive an error that states \"Invalid Social Security number  or  Invalid date of birth\" that means the information we have on file does not match the " information entered. Please call [phone] for assistance.     Step 4: Choose a username that is easy for you to remember, but hard for others to guess. Your username must:     be between five and 24 characters     contain only letters and numbers (no symbols)   Once selected, your username cannot be changed.     Step 5: Choose a password. Your password must:     be at least eight characters     contain at least one uppercase letter     contain one lowercase letter     contain one number or symbol     be different than your username.     Step 6: Choose a security question. This will allow you to reset your password.     Step 7: Enter the answer to your security question. The answer cannot be the same as your password.     Step 8: Enter your email address. You will receive email notifications when new information is available in [employer]. You are now ready to start using [employer]!     If you requested proxy access for a child s or another adult s [employer] account, you will be able to access their health record by clicking on their name    Instructions for Child/Dependent Access  To view your child s record, click on your child's name under your name on the right hand side of the screen.   Instructions for Adult Proxy Access  To view your adult proxy record, click on the adult's name under your name on the right hand side of the screen.    In the event you have technical difficulties, please call   [employer] Services at [phone].

## 2019-04-19 ENCOUNTER — HOSPITAL ENCOUNTER (OUTPATIENT)
Dept: GENERAL RADIOLOGY | Facility: OTHER | Age: 49
Discharge: HOME | End: 2019-04-19
Attending: NURSE PRACTITIONER | Admitting: NURSE PRACTITIONER
Payer: COMMERCIAL

## 2019-04-19 ENCOUNTER — OFFICE VISIT (OUTPATIENT)
Dept: FAMILY MEDICINE | Facility: OTHER | Age: 49
End: 2019-04-19
Attending: NURSE PRACTITIONER
Payer: COMMERCIAL

## 2019-04-19 VITALS
DIASTOLIC BLOOD PRESSURE: 54 MMHG | TEMPERATURE: 99.5 F | SYSTOLIC BLOOD PRESSURE: 98 MMHG | RESPIRATION RATE: 18 BRPM | HEIGHT: 67 IN | BODY MASS INDEX: 20.64 KG/M2 | WEIGHT: 131.5 LBS | HEART RATE: 84 BPM

## 2019-04-19 DIAGNOSIS — M79.672 LEFT FOOT PAIN: ICD-10-CM

## 2019-04-19 DIAGNOSIS — M79.672 LEFT FOOT PAIN: Primary | ICD-10-CM

## 2019-04-19 PROCEDURE — 73630 X-RAY EXAM OF FOOT: CPT | Mod: LT

## 2019-04-19 PROCEDURE — 99213 OFFICE O/P EST LOW 20 MIN: CPT | Performed by: NURSE PRACTITIONER

## 2019-04-19 ASSESSMENT — ENCOUNTER SYMPTOMS: ARTHRALGIAS: 1

## 2019-04-19 ASSESSMENT — MIFFLIN-ST. JEOR: SCORE: 1269.11

## 2019-04-19 ASSESSMENT — PAIN SCALES - GENERAL: PAINLEVEL: MODERATE PAIN (4)

## 2019-04-19 NOTE — NURSING NOTE
Patient presents to clinic experiencing left foot pain with swelling.  Pain rated at 4 and mobility is compromised.    Medication Reconciliation: complete    Marbella Topete LPN

## 2019-04-19 NOTE — PROGRESS NOTES
"t  SUBJECTIVE:   Ailin Martinez is a 48 year old female who presents to clinic today for the following health issues:    HPI  Patient presents for evaluation of left foot pain. Kicked ice 10 weeks ago and injured left foot. Foot turned purple and swollen. She was able to ambulate after accident. Has treated with ibuprofen and ice. Continues to hurt, she feels like it is worsening. She is having to compensate foot pain by walking on her heel. It feels painful with just touching foot. Some tingling in the last three toes of her left foot. She still reports swelling and is having a hard time getting her shoes on. She is wearing sandals at this visit.     Patient Active Problem List    Diagnosis Date Noted     Allergic state 2018     Priority: Medium     Onychomycosis 2016     Priority: Medium     Past Medical History:   Diagnosis Date     Personal history of other medical treatment (CODE)      with two vaginal deliveries      Past Surgical History:   Procedure Laterality Date     EXTRACTION(S) DENTAL      No Comments Provided     LAPAROSCOPIC ABLATION ENDOMETRIOSIS      ,Endometrial Ablation     OTHER SURGICAL HISTORY      ,[id number],OTHER       Review of Systems   Musculoskeletal: Positive for arthralgias and gait problem.        OBJECTIVE:     BP 98/54 (BP Location: Right arm, Patient Position: Sitting, Cuff Size: Adult Regular)   Pulse 84   Temp 99.5  F (37.5  C) (Tympanic)   Resp 18   Ht 1.702 m (5' 7\")   Wt 59.6 kg (131 lb 8 oz)   Breastfeeding? No   BMI 20.60 kg/m    Body mass index is 20.6 kg/m .  Physical Exam   Musculoskeletal:        Left ankle: Normal. No lateral malleolus, no medial malleolus, no AITFL, no CF ligament, no posterior TFL and no head of 5th metatarsal tenderness found. Achilles tendon exhibits no pain.        Feet:    Gait normal.     Diagnostic Test Results:  Xray - PROCEDURE:  XR FOOT LT G/E 3 VW     HISTORY: Left foot pain     COMPARISON:  None.     TECHNIQUE:  " 3 views of the left foot were obtained.     FINDINGS:  No fracture or dislocation is identified. The joint spaces  are preserved.                                                                        IMPRESSION: Normal left foot       CARSON PATEL MD    ASSESSMENT/PLAN:   1. Left foot pain  X ray looks normal. No acute fracture seen. If pain is worsening despite 10 weeks of recovery I suggested further evaluation. She would like to try a walking boot first and then follow-up with ortho/podiatry in one week if no improvement. We elected to try putting patient in post op shoe. Discussed ice for pain and ibuprofen as needed. If no improvement despite these interventions I would suggest further evaluation with podiatry. Patient will call if referral needed.   - XR Foot Left G/E 3 Views; Future    [facility]LLERP[facility]

## 2019-04-22 ENCOUNTER — TELEPHONE (OUTPATIENT)
Dept: FAMILY MEDICINE | Facility: OTHER | Age: 49
End: 2019-04-22

## 2019-04-22 DIAGNOSIS — M79.672 LEFT FOOT PAIN: Primary | ICD-10-CM

## 2019-04-22 NOTE — TELEPHONE ENCOUNTER
Referred to podiatry for left foot pain per last note with ROSA ISELA Gonzalez.  Mindi Garrison PA-C.......... 4/22/2019 8:41 AM

## 2019-04-25 ENCOUNTER — OFFICE VISIT (OUTPATIENT)
Dept: ORTHOPEDICS | Facility: OTHER | Age: 49
End: 2019-04-25
Attending: PODIATRIST
Payer: COMMERCIAL

## 2019-04-25 DIAGNOSIS — M79.672 LEFT FOOT PAIN: Primary | ICD-10-CM

## 2019-04-25 PROCEDURE — G0463 HOSPITAL OUTPT CLINIC VISIT: HCPCS | Performed by: PODIATRIST

## 2019-04-26 ENCOUNTER — HOSPITAL ENCOUNTER (OUTPATIENT)
Dept: MRI IMAGING | Facility: OTHER | Age: 49
Discharge: HOME | End: 2019-04-26
Attending: PODIATRIST | Admitting: PODIATRIST
Payer: COMMERCIAL

## 2019-04-26 DIAGNOSIS — M79.672 LEFT FOOT PAIN: ICD-10-CM

## 2019-04-26 PROCEDURE — 73718 MRI LOWER EXTREMITY W/O DYE: CPT | Mod: LT

## 2019-05-01 NOTE — PROGRESS NOTES
VITALS:   Height:   67  Weight:   131  Pulse rate:  84  Blood Pressure:  98 / 54    CHIEF COMPLAINT: Left Foot Pain    PROBLEMS:   Patient has no noted problems.    PATIENT REPORTED MEDICATIONS:   Patient has no noted medications.    PATIENT REPORTED ALLERGIES:  * SEASONAL (SevereReaction: No reaction details noted)    RISK FACTORS:  Tobacco use:   never smoker  Passive smoke exposure: No  Alcohol Use:   No    HISTORY OF PRESENT ILLNESS:    The patient is here with pain in the left side of her left foot.  She kicked some ice off of her car cleaning up her car 10 weeks.  She has had fairly severe pain in the left side of her left foot or the outside of her left foot every since.  She  gets throbbing and numbness.  It feels like her fourth and fifth toes do not function normally.  She is here to have this evaluated and to discuss options.  She was given a postop shoe which did not help.  X-rays were negative.    The patient's health history form dated 04/25/2019 was reviewed and signed.  Past medical history, surgical history, social history, family history, and review of systems noted.    PAST MEDICAL HISTORY:    No Past Medical History    PAST ORTHOPEDIC SURGICAL HISTORY:    No Past Orthopedic Surgical History    PAST SURGICAL HISTORY:    Cholecystectomy    FAMILY HISTORY:    Father:  Lung Cancer    SOCIAL HISTORY:   Occupation:    Marital Status:    Alcohol Use:  No  Tobacco Use:  Never  Secondhand Smoke Exposure:  No  History of HIV:  No  History of Hepatitis:  No    REVIEW OF SYSTEMS:  Joint or Muscle pain: Yes  Stiffness:  No  Swelling:  Yes  Difficulty in walking: Yes  Cold extremities: No  Weakness of muscles: No  Rash or Itching: No  Bruising:  Yes  Numbness/Tingling: Yes    PHYSICAL EXAMINATION:    CONSTITUTIONAL:  Patient is alert and oriented x3, well-appearing and in no apparent distress.  Affect is pleasant and answers questions appropriately.  VASCULAR:  Circulation is intact with  palpable pedal pulses and has adequate capillary fill time.  NEUROLOGIC:  Light touch sensation is intact to digits.  INTEGUMENT:  No dermatologic lesions are noted.  Skin with normal texture and turgor.  MUSCULOSKELETAL:  Very tender to palpation along the fifth metatarsal and fourth interspace.  Very guarded to exam here.  She does have limited strength in dorsiflexion of these toes.  The extensor tendons appear to be intact.  Likely just a pain response and limiting motion.    ASSESSMENT:    Possible stress fracture left fifth metatarsal.    PLAN:   Discussed condition and treatment with the patient today.  Given the extent of symptoms and negative x-rays, we will get an MRI to rule out other pathology, possible stress fracture to the fifth metatarsal.  I will see her back next week to discuss.  If x-rays are negative for a stress fracture would likely consider a cortisone injection.    Dictated by Jah Atkins DPM  cc:  Dr. Atkins at [facility]    D:  04/25/2019  T:  05/01/2019    Typed and/or reviewed and corrected by signing  below, and sent to the Physician for final review and signature.    This report was created using voice recording software and computer-generated templates. Although every effort has been made to review for and eliminate errors, some errors may still occur.         Electronically signed by Casey Tee -  on 05/01/2019 at 10:22 AM    Electronically signed by Jah Atkins DPM on 05/01/2019 at 11:22 AM

## 2019-05-02 ENCOUNTER — OFFICE VISIT (OUTPATIENT)
Dept: ORTHOPEDICS | Facility: OTHER | Age: 49
End: 2019-05-02
Attending: PODIATRIST
Payer: COMMERCIAL

## 2019-05-02 DIAGNOSIS — Z00.00 ROUTINE GENERAL MEDICAL EXAMINATION AT A HEALTH CARE FACILITY: Primary | ICD-10-CM

## 2019-05-02 PROCEDURE — G0463 HOSPITAL OUTPT CLINIC VISIT: HCPCS | Performed by: PODIATRIST

## 2019-05-08 NOTE — PROGRESS NOTES
CHIEF COMPLAINT: Left Foot Pain/Discuss Left Foot MRI    PROBLEMS:   Patient has no noted problems.    PATIENT REPORTED MEDICATIONS:   Patient has no noted medications.    PATIENT REPORTED ALLERGIES:  * SEASONAL (SevereReaction: No reaction details noted)      HISTORY OF PRESENT ILLNESS:    The patient is here to discuss her left foot MRI and for a potential injection.  I wanted to make sure she did not have a stress fracture due to the nature of her injury.  There are no signs of a stress fracture.    PAST MEDICAL HISTORY:    No Past Medical History    PAST ORTHOPEDIC SURGICAL HISTORY:    No Past Orthopedic Surgical History    PAST SURGICAL HISTORY:    Cholecystectomy    FAMILY HISTORY:    Father:  Lung Cancer    SOCIAL HISTORY:   Occupation:    Marital Status:    Alcohol Use:  No  Tobacco Use:  Never  Secondhand Smoke Exposure:  No  History of HIV:  No  History of Hepatitis:  No    PHYSICAL EXAMINATION:    CONSTITUTIONAL:  Patient is alert and oriented x3, well-appearing and in no apparent distress.  Affect is pleasant and answers questions appropriately.  VASCULAR:  Circulation is intact with palpable pedal pulses and has adequate capillary fill time.  NEUROLOGIC:  Light touch sensation is intact to digits.  INTEGUMENT:  No dermatologic lesions are noted.  Skin with normal texture and turgor.  MUSCULOSKELETAL:  Palpable pain to the third interspace.  Pain with metatarsal squeeze, consistent with her symptoms.    ASSESSMENT:    Lewis's neuroma, left.    PROCEDURES:   Risks and benefits of the procedure were reviewed with the patient.  Informed consent was obtained.  Blood sugar risks for diabetic patients were also discussed.  The patient's left dorsal third interspace was prepped with alcohol, injected into distal interspace for neuroma with 20 mg Kenalog and 1 cc 1% lidocaine. The patient tolerated the procedure well.  Sterile Band-Aid applied.     PLAN:   We did inject the third interspace  today.  Discussed wider shoe gear and see what kind of relief this provides.  Discussed potential for a series of injections if this helps, but continues to linger.  We will plan on a repeat injection in two to three weeks.  Follow up in that event.    Dictated by Jah Atkins DPM  cc:  Dr. Atkins at [facility]     This report was created using voice recording software and computer-generated templates. Although every effort has been made to review for and eliminate errors, some errors may still occur.         Electronically signed by Casey Tee -  on 05/07/2019 at 3:58 PM

## 2021-10-10 ENCOUNTER — OFFICE VISIT (OUTPATIENT)
Dept: FAMILY MEDICINE | Facility: OTHER | Age: 51
End: 2021-10-10
Attending: PHYSICIAN ASSISTANT
Payer: COMMERCIAL

## 2021-10-10 VITALS
OXYGEN SATURATION: 98 % | DIASTOLIC BLOOD PRESSURE: 66 MMHG | WEIGHT: 129.5 LBS | SYSTOLIC BLOOD PRESSURE: 102 MMHG | HEART RATE: 92 BPM | TEMPERATURE: 98.9 F | RESPIRATION RATE: 16 BRPM | BODY MASS INDEX: 20.28 KG/M2

## 2021-10-10 DIAGNOSIS — R07.0 THROAT PAIN: Primary | ICD-10-CM

## 2021-10-10 LAB — GROUP A STREP BY PCR: NOT DETECTED

## 2021-10-10 PROCEDURE — 87651 STREP A DNA AMP PROBE: CPT | Mod: ZL | Performed by: FAMILY MEDICINE

## 2021-10-10 PROCEDURE — 99213 OFFICE O/P EST LOW 20 MIN: CPT | Performed by: FAMILY MEDICINE

## 2021-10-10 ASSESSMENT — PAIN SCALES - GENERAL: PAINLEVEL: EXTREME PAIN (8)

## 2021-10-10 NOTE — PATIENT INSTRUCTIONS

## 2021-10-10 NOTE — NURSING NOTE
"Chief Complaint   Patient presents with     Throat Pain     Patient is here for a sore throat and white patches on her tonsils that started yesterday. Patient would like a strep test and currently declines a COVID test.     Initial /66   Pulse 92   Temp 98.9  F (37.2  C) (Tympanic)   Resp 16   Wt 58.7 kg (129 lb 8 oz)   SpO2 98%   BMI 20.28 kg/m   Estimated body mass index is 20.28 kg/m  as calculated from the following:    Height as of 4/19/19: 1.702 m (5' 7\").    Weight as of this encounter: 58.7 kg (129 lb 8 oz).  Medication Reconciliation: complete    Ailin Pastrana LPN  "

## 2021-10-10 NOTE — PROGRESS NOTES
"Nursing Notes:   Ailin Pastrana LPN  10/10/2021 10:32 AM  Signed  Chief Complaint   Patient presents with     Throat Pain     Patient is here for a sore throat and white patches on her tonsils that started yesterday. Patient would like a strep test and currently declines a COVID test.     Initial /66   Pulse 92   Temp 98.9  F (37.2  C) (Tympanic)   Resp 16   Wt 58.7 kg (129 lb 8 oz)   SpO2 98%   BMI 20.28 kg/m   Estimated body mass index is 20.28 kg/m  as calculated from the following:    Height as of 4/19/19: 1.702 m (5' 7\").    Weight as of this encounter: 58.7 kg (129 lb 8 oz).  Medication Reconciliation: complete    Ailin Pastrana LPN    SUBJECTIVE: 51 year old female with sore throat and possible exudate on tonsils that started yesterday morning. Has had strep many times in the past but not recently.   Nonsmoker.    COVID vaccinated and declines testing today.       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      works at [employer] as        OBJECTIVE:   Vital signs:  Temp: 98.9  F (37.2  C) Temp src: Tympanic BP: 102/66 Pulse: 92   Resp: 16 SpO2: 98 %       Weight: 58.7 kg (129 lb 8 oz)  Estimated body mass index is 20.28 kg/m  as calculated from the following:    Height as of 4/19/19: 1.702 m (5' 7\").    Weight as of this encounter: 58.7 kg (129 lb 8 oz).    Appears healthy and cooperative.  Ears: normal  Oropharynx: moderate erythema and shallow exudates present  Neck: normal, supple and moderate tender anterior cervical nodes L>R    Results for orders placed or performed in visit on 10/10/21   Group A Streptococcus PCR Throat Swab     Status: Normal    Specimen: Throat; Swab   Result Value Ref Range    Group A strep by PCR Not Detected Not Detected    Narrative    The Xpert Xpress Strep A test, performed on the [employer]  Instrument Systems, is a rapid, qualitative in vitro diagnostic test for " the detection of Streptococcus pyogenes (Group A ß-hemolytic Streptococcus, Strep A) in throat swab specimens from patients with signs and symptoms of pharyngitis. The Xpert Xpress Strep A test can be used as an aid in the diagnosis of Group A Streptococcal pharyngitis. The assay is not intended to monitor treatment for Group A Streptococcus infections. The Xpert Xpress Strep A test utilizes an automated real-time polymerase chain reaction (PCR) to detect Streptococcus pyogenes DNA.     I have personally reviewed the labs listed above.    ASSESSMENT:   1. Throat pain          PLAN:   Gargle, use acetaminophen or other OTC analgesics. No antibiotics indicated.   Consider COVID testing if persistent symptoms and mask with clients.   Kasandra Andrew MD  11:23 AM 10/10/2021

## 2022-03-22 ENCOUNTER — HOSPITAL ENCOUNTER (OUTPATIENT)
Dept: MAMMOGRAPHY | Facility: OTHER | Age: 52
Discharge: HOME | End: 2022-03-22
Attending: FAMILY MEDICINE | Admitting: FAMILY MEDICINE
Payer: COMMERCIAL

## 2022-03-22 DIAGNOSIS — Z12.31 VISIT FOR SCREENING MAMMOGRAM: ICD-10-CM

## 2022-03-22 PROCEDURE — 77067 SCR MAMMO BI INCL CAD: CPT

## 2022-11-01 ENCOUNTER — OFFICE VISIT (OUTPATIENT)
Dept: FAMILY MEDICINE | Facility: OTHER | Age: 52
End: 2022-11-01
Attending: NURSE PRACTITIONER
Payer: COMMERCIAL

## 2022-11-01 VITALS
WEIGHT: 133.6 LBS | SYSTOLIC BLOOD PRESSURE: 110 MMHG | HEART RATE: 91 BPM | DIASTOLIC BLOOD PRESSURE: 64 MMHG | OXYGEN SATURATION: 99 % | TEMPERATURE: 97.1 F | BODY MASS INDEX: 20.92 KG/M2

## 2022-11-01 DIAGNOSIS — N32.89 BLADDER IRRITABILITY: Primary | ICD-10-CM

## 2022-11-01 DIAGNOSIS — N39.0 CHRONIC UTI: ICD-10-CM

## 2022-11-01 LAB
ALBUMIN UR-MCNC: NEGATIVE MG/DL
APPEARANCE UR: CLEAR
BACTERIA #/AREA URNS HPF: ABNORMAL /HPF
BILIRUB UR QL STRIP: NEGATIVE
COLOR UR AUTO: YELLOW
GLUCOSE UR STRIP-MCNC: NEGATIVE MG/DL
HGB UR QL STRIP: NEGATIVE
KETONES UR STRIP-MCNC: ABNORMAL MG/DL
LEUKOCYTE ESTERASE UR QL STRIP: NEGATIVE
NITRATE UR QL: NEGATIVE
PH UR STRIP: 5.5 [PH] (ref 5–9)
RBC URINE: <1 /HPF
SP GR UR STRIP: 1 (ref 1–1.03)
SQUAMOUS EPITHELIAL: 5 /HPF
UROBILINOGEN UR STRIP-MCNC: NORMAL MG/DL
WBC URINE: 4 /HPF

## 2022-11-01 PROCEDURE — 87186 SC STD MICRODIL/AGAR DIL: CPT | Mod: ZL | Performed by: PHYSICIAN ASSISTANT

## 2022-11-01 PROCEDURE — 99213 OFFICE O/P EST LOW 20 MIN: CPT | Performed by: PHYSICIAN ASSISTANT

## 2022-11-01 PROCEDURE — 81001 URINALYSIS AUTO W/SCOPE: CPT | Mod: ZL | Performed by: PHYSICIAN ASSISTANT

## 2022-11-01 ASSESSMENT — PAIN SCALES - GENERAL: PAINLEVEL: MILD PAIN (3)

## 2022-11-01 NOTE — PROGRESS NOTES
ASSESSMENT/PLAN:    I have reviewed the nursing notes.  I have reviewed the findings, diagnosis, plan and need for follow up with the patient.    1. Bladder irritability  - UA with Microscopic reflex to Culture  - Urine Culture Aerobic Bacterial  - Vitals stable. PE available for review below. UA results: moderate bacteria, 1 WBC, 4 RBC, trace ketones. At this time, I believe her symptoms are due to caffeine intake, she states she drinks a lot of coffee and consumption of bladder irritants, recommend to decrease intake of bladder irritants, increase water. I did discuss with patient that a urine culture was performed, that we will inform patient if a change to their treatment plan needs to occur based off culture results - with urine cultures typically returning in 1-3 days. In the meantime, recommend, alternating tylenol and ibuprofen every 4-6 hours as needed, warm heating pad, pushing fluids/hydration, cranberry juice/pills, urinating when urge arises. If fevers, chills, flank pain/back pain, inability to urinate/struggle to urinate, signs of dehydration or other worrisome signs occur, patient agreeable to follow up for reevaluation. Patient is in agreement and understanding of the above treatment plan. All questions and concerns were addressed and answered to patient's satisfaction. AVS reviewed with patient.     Discussed warning signs/symptoms indicative of need to f/u    Follow up if symptoms persist or worsen or concerns    I explained my diagnostic considerations and recommendations to the patient, who voiced understanding and agreement with the treatment plan. All questions were answered. We discussed potential side effects of any prescribed or recommended therapies, as well as expectations for response to treatments.    Sharona Hollingsworth PA-C  11/1/2022  1:17 PM    HPI:    Ailin Martinez is a 52 year old female  who presents to [facility] today for concerns of possible UTI, x intermittent for a few months  (feels like improves, then comes back). Sore and dull.     Symptoms: urgency and frequency (trying to avoid UTIs, so urinating more). Mild nausea this AM.   Flank Pain or Back Pain: Yes: right mid to lower  Blood in Urine: No  Last Urination: [facility]   Able to Completely Urinate/Void: No  Prior UTIs: Yes  Exposures to STIs/STDs: No  Fevers, chills, N/V/D: Yes: nausea  Change in Bowel Habits: No  Vaginal Symptoms or Discharge: No  Recent swimming/use of hot tubs/swimming pools/lakes: No    LMP: ablation    Treatments tried: none    Denies CP, SOB, calf tenderness. No new medications. Denies exposure to ill or COVID positive patients.     Past Medical History:   Diagnosis Date     Personal history of other medical treatment (CODE)      with two vaginal deliveries     Past Surgical History:   Procedure Laterality Date     EXTRACTION(S) DENTAL      No Comments Provided     LAPAROSCOPIC ABLATION ENDOMETRIOSIS      ,Endometrial Ablation     OTHER SURGICAL HISTORY      ,807807,OTHER     Social History     Tobacco Use     Smoking status: Never     Smokeless tobacco: Never   Substance Use Topics     Alcohol use: No     Current Outpatient Medications   Medication Sig Dispense Refill     ibuprofen (ADVIL/MOTRIN) 600 MG tablet Take 1 tablet (600 mg) by mouth every 8 hours as needed for moderate pain (Patient not taking: Reported on 10/10/2021) 45 tablet 0     order for DME Equipment being ordered: Sling (Patient not taking: Reported on 10/10/2021) 1 Device 0     traMADol (ULTRAM) 50 MG tablet Take 1 tablet (50 mg) by mouth every 6 hours as needed for severe pain (Patient not taking: Reported on 10/10/2021) 10 tablet 0     Allergies   Allergen Reactions     Seasonal Allergies Other (See Comments)     Sneezing, watery eyes     Past medical history, past surgical history, current medications and allergies reviewed and accurate to the best of my knowledge.      ROS:  Refer to HPI    /64   Pulse 91    Temp 97.1  F (36.2  C) (Tympanic)   Wt 60.6 kg (133 lb 9.6 oz)   SpO2 99%   BMI 20.92 kg/m      EXAM:  General Appearance: Well appearing 52 year old female, appropriate appearance for age. No acute distress   Respiratory: normal chest wall and respirations.  Normal effort.  Clear to auscultation bilaterally, no wheezing, crackles or rhonchi.  No increased work of breathing.  No cough appreciated.  Cardiac: RRR with no murmurs  Abdomen: soft, nontender, no rigidity, no rebound tenderness or guarding, normal bowel sounds present  :  No suprapubic tenderness to palpation.  Absent CVA tenderness to palpation.    Musculoskeletal:  Equal movement of bilateral upper extremities.  Equal movement of bilateral lower extremities.  Normal gait.    Dermatological: no rashes noted of exposed skin  Psychological: normal affect, alert, oriented, and pleasant.     Labs:  Results for orders placed or performed in visit on 11/01/22   UA with Microscopic reflex to Culture     Status: Abnormal    Specimen: Urine, Midstream   Result Value Ref Range    Color Urine Yellow Colorless, Straw, Light Yellow, Yellow    Appearance Urine Clear Clear    Glucose Urine Negative Negative mg/dL    Bilirubin Urine Negative Negative    Ketones Urine Trace (A) Negative mg/dL    Specific Gravity Urine 1.004 1.000 - 1.030    Blood Urine Negative Negative    pH Urine 5.5 5.0 - 9.0    Protein Albumin Urine Negative Negative mg/dL    Urobilinogen Urine Normal Normal, 2.0 mg/dL    Nitrite Urine Negative Negative    Leukocyte Esterase Urine Negative Negative    Bacteria Urine Moderate (A) None Seen /HPF    RBC Urine <1 <=2 /HPF    WBC Urine 4 <=5 /HPF    Squamous Epithelials Urine 5 (H) <=1 /HPF    Narrative    Urine Culture not indicated     Xray:  None

## 2022-11-01 NOTE — NURSING NOTE
Patient presents to clinic to rule out urinary tract infection, patient has had symptoms for some time with a lot of pressure in abdominal area. Patient states it has been coming and going for a few months. Patient states she has aching pain after she voids, and burns after  .Kaur Roach LPN on 11/1/2022 at 1:10 PM

## 2022-11-03 LAB — BACTERIA UR CULT: ABNORMAL

## 2022-11-03 RX ORDER — NITROFURANTOIN 25; 75 MG/1; MG/1
100 CAPSULE ORAL 2 TIMES DAILY
Qty: 14 CAPSULE | Refills: 0 | Status: SHIPPED | OUTPATIENT
Start: 2022-11-03 | End: 2022-11-10

## 2023-02-06 ENCOUNTER — OFFICE VISIT (OUTPATIENT)
Dept: FAMILY MEDICINE | Facility: OTHER | Age: 53
End: 2023-02-06
Payer: COMMERCIAL

## 2023-02-06 VITALS
HEART RATE: 79 BPM | WEIGHT: 131 LBS | BODY MASS INDEX: 21.05 KG/M2 | HEIGHT: 66 IN | TEMPERATURE: 98.3 F | RESPIRATION RATE: 16 BRPM | SYSTOLIC BLOOD PRESSURE: 118 MMHG | DIASTOLIC BLOOD PRESSURE: 64 MMHG | OXYGEN SATURATION: 100 %

## 2023-02-06 DIAGNOSIS — B02.9 HERPES ZOSTER WITHOUT COMPLICATION: ICD-10-CM

## 2023-02-06 DIAGNOSIS — R10.11 RUQ ABDOMINAL PAIN: Primary | ICD-10-CM

## 2023-02-06 DIAGNOSIS — R53.83 OTHER FATIGUE: ICD-10-CM

## 2023-02-06 DIAGNOSIS — R21 RASH: ICD-10-CM

## 2023-02-06 LAB
ALBUMIN SERPL BCG-MCNC: 4.4 G/DL (ref 3.5–5.2)
ALP SERPL-CCNC: 57 U/L (ref 35–104)
ALT SERPL W P-5'-P-CCNC: 12 U/L (ref 10–35)
ANION GAP SERPL CALCULATED.3IONS-SCNC: 11 MMOL/L (ref 7–15)
AST SERPL W P-5'-P-CCNC: 15 U/L (ref 10–35)
BASOPHILS # BLD AUTO: 0.1 10E3/UL (ref 0–0.2)
BASOPHILS NFR BLD AUTO: 1 %
BILIRUB SERPL-MCNC: 0.6 MG/DL
BUN SERPL-MCNC: 7.9 MG/DL (ref 6–20)
CALCIUM SERPL-MCNC: 9.1 MG/DL (ref 8.6–10)
CHLORIDE SERPL-SCNC: 105 MMOL/L (ref 98–107)
CREAT SERPL-MCNC: 0.65 MG/DL (ref 0.51–0.95)
DEPRECATED HCO3 PLAS-SCNC: 24 MMOL/L (ref 22–29)
EOSINOPHIL # BLD AUTO: 0.1 10E3/UL (ref 0–0.7)
EOSINOPHIL NFR BLD AUTO: 2 %
ERYTHROCYTE [DISTWIDTH] IN BLOOD BY AUTOMATED COUNT: 13 % (ref 10–15)
GFR SERPL CREATININE-BSD FRML MDRD: >90 ML/MIN/1.73M2
GLUCOSE SERPL-MCNC: 102 MG/DL (ref 70–99)
HCT VFR BLD AUTO: 40.2 % (ref 35–47)
HGB BLD-MCNC: 13.8 G/DL (ref 11.7–15.7)
IMM GRANULOCYTES # BLD: 0 10E3/UL
IMM GRANULOCYTES NFR BLD: 0 %
LYMPHOCYTES # BLD AUTO: 1 10E3/UL (ref 0.8–5.3)
LYMPHOCYTES NFR BLD AUTO: 19 %
MCH RBC QN AUTO: 33.3 PG (ref 26.5–33)
MCHC RBC AUTO-ENTMCNC: 34.3 G/DL (ref 31.5–36.5)
MCV RBC AUTO: 97 FL (ref 78–100)
MONOCYTES # BLD AUTO: 0.3 10E3/UL (ref 0–1.3)
MONOCYTES NFR BLD AUTO: 6 %
NEUTROPHILS # BLD AUTO: 3.9 10E3/UL (ref 1.6–8.3)
NEUTROPHILS NFR BLD AUTO: 72 %
NRBC # BLD AUTO: 0 10E3/UL
NRBC BLD AUTO-RTO: 0 /100
PLATELET # BLD AUTO: 211 10E3/UL (ref 150–450)
POTASSIUM SERPL-SCNC: 3.8 MMOL/L (ref 3.4–5.3)
PROT SERPL-MCNC: 6.9 G/DL (ref 6.4–8.3)
RBC # BLD AUTO: 4.15 10E6/UL (ref 3.8–5.2)
SODIUM SERPL-SCNC: 140 MMOL/L (ref 136–145)
WBC # BLD AUTO: 5.5 10E3/UL (ref 4–11)

## 2023-02-06 PROCEDURE — 36415 COLL VENOUS BLD VENIPUNCTURE: CPT | Mod: ZL | Performed by: NURSE PRACTITIONER

## 2023-02-06 PROCEDURE — 80053 COMPREHEN METABOLIC PANEL: CPT | Mod: ZL | Performed by: NURSE PRACTITIONER

## 2023-02-06 PROCEDURE — 99203 OFFICE O/P NEW LOW 30 MIN: CPT | Performed by: NURSE PRACTITIONER

## 2023-02-06 PROCEDURE — 85004 AUTOMATED DIFF WBC COUNT: CPT | Mod: ZL | Performed by: NURSE PRACTITIONER

## 2023-02-06 RX ORDER — TRIAMCINOLONE ACETONIDE 0.25 MG/G
OINTMENT TOPICAL 2 TIMES DAILY
Qty: 15 G | Refills: 0 | Status: SHIPPED | OUTPATIENT
Start: 2023-02-06 | End: 2023-02-13

## 2023-02-06 RX ORDER — VALACYCLOVIR HYDROCHLORIDE 1 G/1
1000 TABLET, FILM COATED ORAL 3 TIMES DAILY
Qty: 21 TABLET | Refills: 0 | Status: SHIPPED | OUTPATIENT
Start: 2023-02-06 | End: 2024-03-12

## 2023-02-06 RX ORDER — IBUPROFEN 200 MG
200 TABLET ORAL EVERY 4 HOURS PRN
COMMUNITY
End: 2024-03-12

## 2023-02-06 ASSESSMENT — ENCOUNTER SYMPTOMS
PSYCHIATRIC NEGATIVE: 1
ABDOMINAL PAIN: 1
DIARRHEA: 0
FATIGUE: 1
ARTHRALGIAS: 1
NAUSEA: 1
CONSTIPATION: 0
VOMITING: 0
CARDIOVASCULAR NEGATIVE: 1
CHOKING: 0
HEADACHES: 1
RESPIRATORY NEGATIVE: 1

## 2023-02-06 ASSESSMENT — PAIN SCALES - GENERAL: PAINLEVEL: SEVERE PAIN (7)

## 2023-02-06 NOTE — NURSING NOTE
Chief Complaint   Patient presents with     Derm Problem     X 5 DAYS - R side- hx of shingles     Patient treating with ibuprofen.    Advanced Care Planning on file? no    Medication Review Completed: complete    FOOD SECURITY SCREENING QUESTIONS:    The next two questions are to help us understand your food security.  If you are feeling you need any assistance in this area, we have resources available to support you today.    Hunger Vital Signs:  Within the past 12 months we worried whether our food would run out before we got money to buy more. Never  Within the past 12 months the food we bought just didn't last and we didn't have money to get more. Never    Therese Solo LPN

## 2023-02-06 NOTE — PROGRESS NOTES
Ailin Martinez  1970    ASSESSMENT/PLAN:   1. RUQ abdominal pain  Right upper quadrant abdominal pain, associated with some nausea and rash.  No constipation or diarrhea.  Fever, chills or bodies.  Review with patient this is likely secondary to her shingles rash.  Does have history of cholecystectomy.  She has not had any recent labs in over 5 years that I can see here on the EMR.  She does not routinely go to the doctor.  Abdominal exam overall benign.  With her right upper quadrant abdominal pain persisting over the past few days we discussed proceeding with lab work to rule out any other abnormalities.  Patient like proceed with lab work.  CMP and CBC were ordered.  She will be notified of results to return.  - Comprehensive Metabolic Panel; Future  - CBC and Differential; Future    2. Rash  3. Herpes zoster without complication  Reviewed patient that rash does seem to be improving.  Currently rash is more dry, scabbed with red papular lesions along right lower rib, along T8/9 dermatome.  Stated symptoms are burning, deep discomfort.  Intermittent itching however more pain.  She has a picture from initial rash on Thursday/Friday which does appear consistent with shingles rash.  Symptoms also included fatigue and headache.  Patient has been under an immense amount of stress.  Although symptoms started Thursday Friday, due to recurrent shingles I think it would be worthwhile to trial Valtrex 3 times a day for 7 days.  I will also prescribe topical steroid for her to use as needed for itching.  Recommend adequate rest, Tylenol ibuprofen to help with symptoms.  Patient has a history of herpes zoster, possibly 3 other times in her life.  After resolution of symptoms, I encouraged her to set a follow-up with her PCP to receive Shingrix vaccination.  Patient also return if rash does not continue to improve.  - triamcinolone (KENALOG) 0.025 % external ointment; Apply topically 2 times daily for 7 days  Dispense: 15  "g; Refill: 0  - valACYclovir (VALTREX) 1000 mg tablet; Take 1 tablet (1,000 mg) by mouth 3 times daily for 7 days  Dispense: 21 tablet; Refill: 0  4. Other fatigue  As above.    Patient agrees with plan of care and verbalizes understating. AVS printed. Patient education provided verbally and written instructions provided as requested. Patient made aware of emergent sings and symptoms to monitor for and when to seek additional care/follow up.     SUBJECTIVE:   CHIEF COMPLAINT/ REASON FOR VISIT  Patient presents with:  Derm Problem: X 5 DAYS - R side- hx of shingles     HISTORY OF PRESENT ILLNESS  Ailin Martinez is a pleasant 52 year old female [facility] with concern for right upper quadrant pain and rash for her right mid abdomen.    During Thursday patient had tenderness to touch on her right upper quadrant.  She had to change her shirt as her skin was so irritated.  Then Thursday evening she noticed a red bumpy rash.  It was tender to touch.  She described a burning feeling, she stated it felt like a blister or a burn.  This is right over her right lower rib.  She states the pain felt deep \" like she broken a rib\".  She denies any fall or injury.  No change in workout regimen.   Patient has felt fatigued with a headache.  Due to the pain she felt nauseous otherwise no other gastrointestinal symptoms.  No vomiting or diarrhea.  No upper respiratory symptoms.  Patient eats healthy, works out routinely.  She does not drink alcohol.  No changes in her diet.  Patient states she has been very stressed and overwhelmed lately.     Reports he has a history of shingles, when she was younger at age 24 she had shingles on her back, different times she had the second shingles around her left eye and 12 years ago had shingles on her left forearm.  I have reviewed the nursing notes.  I have reviewed allergies, medication list, problem list, and past medical history.    REVIEW OF SYSTEMS  Review of Systems " "  Constitutional: Positive for fatigue.   HENT: Negative.    Respiratory: Negative.  Negative for choking.    Cardiovascular: Negative.  Negative for chest pain.   Gastrointestinal: Positive for abdominal pain and nausea. Negative for constipation, diarrhea and vomiting.   Genitourinary: Negative.    Musculoskeletal: Positive for arthralgias.   Skin: Positive for rash.   Neurological: Positive for headaches.   Psychiatric/Behavioral: Negative.       VITAL SIGNS  Vitals:    02/06/23 0949   BP: 118/64   BP Location: Left arm   Patient Position: Sitting   Cuff Size: Adult Regular   Pulse: 79   Resp: 16   Temp: 98.3  F (36.8  C)   TempSrc: Tympanic   SpO2: 100%   Weight: 59.4 kg (131 lb)   Height: 1.676 m (5' 6\")      Body mass index is 21.14 kg/m .    OBJECTIVE:   PHYSICAL EXAM  Physical Exam  Vitals reviewed.   Constitutional:       Appearance: Normal appearance. She is not ill-appearing or toxic-appearing.   HENT:      Head: Normocephalic and atraumatic.      Nose: Nose normal.   Eyes:      Conjunctiva/sclera: Conjunctivae normal.   Cardiovascular:      Rate and Rhythm: Normal rate and regular rhythm.      Pulses: Normal pulses.      Heart sounds: Normal heart sounds. No murmur heard.  Pulmonary:      Effort: Pulmonary effort is normal. No respiratory distress.      Breath sounds: Normal breath sounds. No wheezing.   Abdominal:      General: Bowel sounds are normal. There is no distension.      Palpations: Abdomen is soft.      Tenderness: There is abdominal tenderness in the right upper quadrant and right lower quadrant. There is no right CVA tenderness, left CVA tenderness or guarding. Negative signs include Shea's sign, Rovsing's sign, McBurney's sign and psoas sign.       Skin:     Findings: Erythema and rash present.          Neurological:      General: No focal deficit present.      Mental Status: She is alert and oriented to person, place, and time.   Psychiatric:         Mood and Affect: Mood normal.        "  Behavior: Behavior normal.         Thought Content: Thought content normal.         Judgment: Judgment normal.        DIAGNOSTICS  Results for orders placed or performed in visit on 02/06/23   CBC and Differential     Status: None ()    Narrative    The following orders were created for panel order CBC and Differential.  Procedure                               Abnormality         Status                     ---------                               -----------         ------                     CBC with platelets and d...[[id number]]                                                   Please view results for these tests on the individual orders.        Cindy Darby NP  [facility]

## 2023-04-07 ENCOUNTER — HOSPITAL ENCOUNTER (OUTPATIENT)
Dept: MAMMOGRAPHY | Facility: OTHER | Age: 53
Discharge: HOME | End: 2023-04-07
Attending: FAMILY MEDICINE | Admitting: FAMILY MEDICINE
Payer: COMMERCIAL

## 2023-04-07 DIAGNOSIS — Z12.31 VISIT FOR SCREENING MAMMOGRAM: ICD-10-CM

## 2023-04-07 PROCEDURE — 77067 SCR MAMMO BI INCL CAD: CPT

## 2023-05-06 ENCOUNTER — HEALTH MAINTENANCE LETTER (OUTPATIENT)
Age: 53
End: 2023-05-06

## 2024-02-19 ENCOUNTER — OFFICE VISIT (OUTPATIENT)
Dept: FAMILY MEDICINE | Facility: OTHER | Age: 54
End: 2024-02-19
Attending: PHYSICIAN ASSISTANT
Payer: COMMERCIAL

## 2024-02-19 ENCOUNTER — ORDERS ONLY (AUTO-RELEASED) (OUTPATIENT)
Dept: FAMILY MEDICINE | Facility: OTHER | Age: 54
End: 2024-02-19

## 2024-02-19 VITALS
SYSTOLIC BLOOD PRESSURE: 122 MMHG | OXYGEN SATURATION: 98 % | TEMPERATURE: 97.5 F | BODY MASS INDEX: 19.46 KG/M2 | HEART RATE: 85 BPM | RESPIRATION RATE: 16 BRPM | HEIGHT: 67 IN | DIASTOLIC BLOOD PRESSURE: 70 MMHG | WEIGHT: 124 LBS

## 2024-02-19 DIAGNOSIS — Z12.11 SPECIAL SCREENING FOR MALIGNANT NEOPLASMS, COLON: ICD-10-CM

## 2024-02-19 DIAGNOSIS — Z23 NEED FOR DIPHTHERIA-TETANUS-PERTUSSIS (TDAP) VACCINE: ICD-10-CM

## 2024-02-19 DIAGNOSIS — R00.2 PALPITATIONS: Primary | ICD-10-CM

## 2024-02-19 DIAGNOSIS — Z13.220 SCREENING CHOLESTEROL LEVEL: ICD-10-CM

## 2024-02-19 LAB
ALBUMIN SERPL BCG-MCNC: 4.6 G/DL (ref 3.5–5.2)
ALP SERPL-CCNC: 73 U/L (ref 40–150)
ALT SERPL W P-5'-P-CCNC: 27 U/L (ref 0–50)
ANION GAP SERPL CALCULATED.3IONS-SCNC: 8 MMOL/L (ref 7–15)
AST SERPL W P-5'-P-CCNC: 20 U/L (ref 0–45)
ATRIAL RATE - MUSE: 77 BPM
BASOPHILS # BLD AUTO: 0.1 10E3/UL (ref 0–0.2)
BASOPHILS NFR BLD AUTO: 1 %
BILIRUB SERPL-MCNC: 0.6 MG/DL
BUN SERPL-MCNC: 8.8 MG/DL (ref 6–20)
CALCIUM SERPL-MCNC: 9.8 MG/DL (ref 8.6–10)
CHLORIDE SERPL-SCNC: 103 MMOL/L (ref 98–107)
CHOLEST SERPL-MCNC: 179 MG/DL
CREAT SERPL-MCNC: 0.58 MG/DL (ref 0.51–0.95)
DEPRECATED HCO3 PLAS-SCNC: 28 MMOL/L (ref 22–29)
DIASTOLIC BLOOD PRESSURE - MUSE: NORMAL MMHG
EGFRCR SERPLBLD CKD-EPI 2021: >90 ML/MIN/1.73M2
EOSINOPHIL # BLD AUTO: 0.1 10E3/UL (ref 0–0.7)
EOSINOPHIL NFR BLD AUTO: 1 %
ERYTHROCYTE [DISTWIDTH] IN BLOOD BY AUTOMATED COUNT: 13.3 % (ref 10–15)
FASTING STATUS PATIENT QL REPORTED: NO
GLUCOSE SERPL-MCNC: 97 MG/DL (ref 70–99)
HCT VFR BLD AUTO: 40 % (ref 35–47)
HDLC SERPL-MCNC: 71 MG/DL
HGB BLD-MCNC: 13.8 G/DL (ref 11.7–15.7)
IMM GRANULOCYTES # BLD: 0 10E3/UL
IMM GRANULOCYTES NFR BLD: 0 %
INTERPRETATION ECG - MUSE: NORMAL
IRON BINDING CAPACITY (ROCHE): 267 UG/DL (ref 240–430)
IRON SATN MFR SERPL: 39 % (ref 15–46)
IRON SERPL-MCNC: 104 UG/DL (ref 37–145)
LDLC SERPL CALC-MCNC: 99 MG/DL
LYMPHOCYTES # BLD AUTO: 1.4 10E3/UL (ref 0.8–5.3)
LYMPHOCYTES NFR BLD AUTO: 21 %
MAGNESIUM SERPL-MCNC: 1.8 MG/DL (ref 1.7–2.3)
MCH RBC QN AUTO: 32.9 PG (ref 26.5–33)
MCHC RBC AUTO-ENTMCNC: 34.5 G/DL (ref 31.5–36.5)
MCV RBC AUTO: 96 FL (ref 78–100)
MONOCYTES # BLD AUTO: 0.4 10E3/UL (ref 0–1.3)
MONOCYTES NFR BLD AUTO: 6 %
NEUTROPHILS # BLD AUTO: 4.7 10E3/UL (ref 1.6–8.3)
NEUTROPHILS NFR BLD AUTO: 71 %
NONHDLC SERPL-MCNC: 108 MG/DL
NRBC # BLD AUTO: 0 10E3/UL
NRBC BLD AUTO-RTO: 0 /100
P AXIS - MUSE: 71 DEGREES
PLATELET # BLD AUTO: 224 10E3/UL (ref 150–450)
POTASSIUM SERPL-SCNC: 3.9 MMOL/L (ref 3.4–5.3)
PR INTERVAL - MUSE: 146 MS
PROT SERPL-MCNC: 6.8 G/DL (ref 6.4–8.3)
QRS DURATION - MUSE: 78 MS
QT - MUSE: 398 MS
QTC - MUSE: 450 MS
R AXIS - MUSE: 90 DEGREES
RBC # BLD AUTO: 4.19 10E6/UL (ref 3.8–5.2)
SODIUM SERPL-SCNC: 139 MMOL/L (ref 135–145)
SYSTOLIC BLOOD PRESSURE - MUSE: NORMAL MMHG
T AXIS - MUSE: 54 DEGREES
TRIGL SERPL-MCNC: 47 MG/DL
TSH SERPL DL<=0.005 MIU/L-ACNC: 1.21 UIU/ML (ref 0.3–4.2)
VENTRICULAR RATE- MUSE: 77 BPM
WBC # BLD AUTO: 6.6 10E3/UL (ref 4–11)

## 2024-02-19 PROCEDURE — 84460 ALANINE AMINO (ALT) (SGPT): CPT | Mod: ZL | Performed by: PHYSICIAN ASSISTANT

## 2024-02-19 PROCEDURE — 80061 LIPID PANEL: CPT | Mod: ZL | Performed by: PHYSICIAN ASSISTANT

## 2024-02-19 PROCEDURE — 85025 COMPLETE CBC W/AUTO DIFF WBC: CPT | Mod: ZL | Performed by: PHYSICIAN ASSISTANT

## 2024-02-19 PROCEDURE — 99213 OFFICE O/P EST LOW 20 MIN: CPT | Mod: 25 | Performed by: PHYSICIAN ASSISTANT

## 2024-02-19 PROCEDURE — 83735 ASSAY OF MAGNESIUM: CPT | Mod: ZL | Performed by: PHYSICIAN ASSISTANT

## 2024-02-19 PROCEDURE — 90715 TDAP VACCINE 7 YRS/> IM: CPT | Performed by: PHYSICIAN ASSISTANT

## 2024-02-19 PROCEDURE — 83550 IRON BINDING TEST: CPT | Mod: ZL | Performed by: PHYSICIAN ASSISTANT

## 2024-02-19 PROCEDURE — 90471 IMMUNIZATION ADMIN: CPT | Performed by: PHYSICIAN ASSISTANT

## 2024-02-19 PROCEDURE — 36415 COLL VENOUS BLD VENIPUNCTURE: CPT | Mod: ZL | Performed by: PHYSICIAN ASSISTANT

## 2024-02-19 PROCEDURE — 93000 ELECTROCARDIOGRAM COMPLETE: CPT | Performed by: INTERNAL MEDICINE

## 2024-02-19 PROCEDURE — 84443 ASSAY THYROID STIM HORMONE: CPT | Mod: ZL | Performed by: PHYSICIAN ASSISTANT

## 2024-02-19 ASSESSMENT — PAIN SCALES - GENERAL: PAINLEVEL: NO PAIN (0)

## 2024-02-19 NOTE — PROGRESS NOTES
Assessment & Plan     1. Palpitations  Differential includes cardiac arrhythmia, anemia, vitamin, iron, electrolyte abnormality, thyroid dysfunction, related to stress/anxiety, other underlying cardiac cause, underlying pulmonary cause, etc.  Vitals and exam stable.  EKG and lab work stable.  Options given to patient regarding Zio patch versus continue to focus on stress reduction.  Symptoms seem to correlate with increased testing anxiety with significant life stressors and changes.  Patient will notify with preferences so follow-up orders can be placed.  - CBC and Differential; Future  - Comprehensive Metabolic Panel; Future  - TSH Reflex GH; Future  - Magnesium; Future  - EKG 12-lead, tracing only (Same Day)  - Iron & Iron Binding Capacity; Future  - CBC and Differential  - Comprehensive Metabolic Panel  - TSH Reflex GH  - Magnesium  - Iron & Iron Binding Capacity    2. Special screening for malignant neoplasms, colon  - COLOGUARD([employer]); Future    3. Need for diphtheria-tetanus-pertussis (Tdap) vaccine  - TDAP 7+ (ADACEL,BOOSTRIX)    4. Screening cholesterol level  Lipids mildly elevated but stable.  ASCVD risk were 0.8% as below.  Continue to focus on healthy lifestyle for now.  The 10-year ASCVD risk score (Aaron JASSO, et al., 2019) is: 0.8%    Values used to calculate the score:      Age: 53 years      Sex: Female      Is Non- : No      Diabetic: No      Tobacco smoker: No      Systolic Blood Pressure: 122 mmHg      Is BP treated: No      HDL Cholesterol: 71 mg/dL      Total Cholesterol: 179 mg/dL  - Lipid Panel; Future  - Lipid Panel        No follow-ups on file.    Jeannie Parisi is a 53 year old, presenting for the following health issues:  Menopausal Sx    HPI   Here for evaluation of palpitations/tachycardia.  Patient reports symptom onset around September.  Reports there were episodes where she would awaken the night feeling like she was having anxiety attack.   Reports this developed after one of her friends passed away in her sleep unexpectedly.  Those symptoms have seemed to improve however she has been struggling with palpitations and racing heart mainly in the early mornings a few episodes a week since then.  Does admit to being under increased rest and anxiety with the passing of her friend, children moving out of the home, 's family stresses.  Reports episodes will develop on their own and resolve on their own.  No associated chest pains, lightheadedness, dizziness, syncope, headaches, vision changes, shortness of breath.  Patient is wondering if this could also be signs of menopause.  Patient had an endometrial ablation several years ago so is no longer having menstrual bleeding.  Reports no known other menstrual symptoms such as hot flashes.      PAST MEDICAL HISTORY:   Past Medical History:   Diagnosis Date    Personal history of other medical treatment (CODE)      with two vaginal deliveries       PAST SURGICAL HISTORY:   Past Surgical History:   Procedure Laterality Date    EXTRACTION(S) DENTAL      No Comments Provided    LAPAROSCOPIC ABLATION ENDOMETRIOSIS      ,Endometrial Ablation    OTHER SURGICAL HISTORY      ,[id number],OTHER       FAMILY HISTORY:   Family History   Problem Relation Age of Onset    Family History Negative Mother         Good Health    Family History Negative Father         Good Health    Breast Cancer No family hx of         Cancer-breast       SOCIAL HISTORY:   Social History     Tobacco Use    Smoking status: Never     Passive exposure: Never    Smokeless tobacco: Never   Substance Use Topics    Alcohol use: No        Allergies   Allergen Reactions    Seasonal Allergies Other (See Comments)     Sneezing, watery eyes     Current Outpatient Medications   Medication    ibuprofen (ADVIL/MOTRIN) 200 MG tablet    valACYclovir (VALTREX) 1000 mg tablet     No current facility-administered medications for this visit.  "        Review of Systems  Per HPI        Objective    /70   Pulse 85   Temp 97.5  F (36.4  C) (Tympanic)   Resp 16   Ht 1.695 m (5' 6.75\")   Wt 56.2 kg (124 lb)   SpO2 98%   Breastfeeding No   BMI 19.57 kg/m    Body mass index is 19.57 kg/m .  Physical Exam   General: Pleasant, in no apparent distress.  Eyes: Sclera are white and conjunctiva are clear bilaterally. Lacrimal apparatus free of erythema, edema, and discharge bilaterally.  Ears: External ears without erythema or edema. T  Nose: External nose is symmetrical and free of lesions and deformities.   Cardiovascular: Regular rate and rhythm with S1 equal to S2. No murmurs, friction rubs, or gallops.   Respiratory: Lungs are resonant and clear to auscultation bilaterally. No wheezes, crackles, or rhonchi.  Skin: No jaundice, pallor, rashes, or lesions.  Psych: Appropriate mood and affect.      Results for orders placed or performed in visit on 02/19/24   Lipid Panel     Status: None   Result Value Ref Range    Cholesterol 179 <200 mg/dL    Triglycerides 47 <150 mg/dL    Direct Measure HDL 71 >=50 mg/dL    LDL Cholesterol Calculated 99 <=100 mg/dL    Non HDL Cholesterol 108 <130 mg/dL    Patient Fasting > 8hrs? No     Narrative    Cholesterol  Desirable:  <200 mg/dL    Triglycerides  Normal:  Less than 150 mg/dL  Borderline High:  150-199 mg/dL  High:  200-499 mg/dL  Very High:  Greater than or equal to 500 mg/dL    Direct Measure HDL  Female:  Greater than or equal to 50 mg/dL   Male:  Greater than or equal to 40 mg/dL    LDL Cholesterol  Desirable:  <100mg/dL  Above Desirable:  100-129 mg/dL   Borderline High:  130-159 mg/dL   High:  160-189 mg/dL   Very High:  >= 190 mg/dL    Non HDL Cholesterol  Desirable:  130 mg/dL  Above Desirable:  130-159 mg/dL  Borderline High:  160-189 mg/dL  High:  190-219 mg/dL  Very High:  Greater than or equal to 220 mg/dL   Comprehensive Metabolic Panel     Status: Normal   Result Value Ref Range    Sodium 139 135 - " 145 mmol/L    Potassium 3.9 3.4 - 5.3 mmol/L    Carbon Dioxide (CO2) 28 22 - 29 mmol/L    Anion Gap 8 7 - 15 mmol/L    Urea Nitrogen 8.8 6.0 - 20.0 mg/dL    Creatinine 0.58 0.51 - 0.95 mg/dL    GFR Estimate >90 >60 mL/min/1.73m2    Calcium 9.8 8.6 - 10.0 mg/dL    Chloride 103 98 - 107 mmol/L    Glucose 97 70 - 99 mg/dL    Alkaline Phosphatase 73 40 - 150 U/L    AST 20 0 - 45 U/L    ALT 27 0 - 50 U/L    Protein Total 6.8 6.4 - 8.3 g/dL    Albumin 4.6 3.5 - 5.2 g/dL    Bilirubin Total 0.6 <=1.2 mg/dL   TSH Reflex GH     Status: Normal   Result Value Ref Range    TSH 1.21 0.30 - 4.20 uIU/mL   Magnesium     Status: Normal   Result Value Ref Range    Magnesium 1.8 1.7 - 2.3 mg/dL   Iron & Iron Binding Capacity     Status: Normal   Result Value Ref Range    Iron 104 37 - 145 ug/dL    Iron Binding Capacity 267 240 - 430 ug/dL    Iron Sat Index 39 15 - 46 %   CBC with platelets and differential     Status: None   Result Value Ref Range    WBC Count 6.6 4.0 - 11.0 10e3/uL    RBC Count 4.19 3.80 - 5.20 10e6/uL    Hemoglobin 13.8 11.7 - 15.7 g/dL    Hematocrit 40.0 35.0 - 47.0 %    MCV 96 78 - 100 fL    MCH 32.9 26.5 - 33.0 pg    MCHC 34.5 31.5 - 36.5 g/dL    RDW 13.3 10.0 - 15.0 %    Platelet Count 224 150 - 450 10e3/uL    % Neutrophils 71 %    % Lymphocytes 21 %    % Monocytes 6 %    % Eosinophils 1 %    % Basophils 1 %    % Immature Granulocytes 0 %    NRBCs per 100 WBC 0 <1 /100    Absolute Neutrophils 4.7 1.6 - 8.3 10e3/uL    Absolute Lymphocytes 1.4 0.8 - 5.3 10e3/uL    Absolute Monocytes 0.4 0.0 - 1.3 10e3/uL    Absolute Eosinophils 0.1 0.0 - 0.7 10e3/uL    Absolute Basophils 0.1 0.0 - 0.2 10e3/uL    Absolute Immature Granulocytes 0.0 <=0.4 10e3/uL    Absolute NRBCs 0.0 10e3/uL   EKG 12-lead, tracing only (Same Day)     Status: None (Preliminary result)   Result Value Ref Range    Systolic Blood Pressure  mmHg    Diastolic Blood Pressure  mmHg    Ventricular Rate 77 BPM    Atrial Rate 77 BPM    CA Interval 146 ms    QRS  Duration 78 ms     ms    QTc 450 ms    P Axis 71 degrees    R AXIS 90 degrees    T Axis 54 degrees    Interpretation ECG       Sinus rhythm  Rightward axis  Borderline ECG  No previous ECGs available     CBC and Differential     Status: None    Narrative    The following orders were created for panel order CBC and Differential.  Procedure                               Abnormality         Status                     ---------                               -----------         ------                     CBC with platelets and d...[[id number]]                      Final result                 Please view results for these tests on the individual orders.         Signed Electronically by: Deborah Kuhn PA-C

## 2024-02-19 NOTE — NURSING NOTE
Chief Complaint   Patient presents with    Menopausal Sx         Medication Reconciliation: complete    Renae oMnae, LPN

## 2024-02-20 ENCOUNTER — TELEPHONE (OUTPATIENT)
Dept: FAMILY MEDICINE | Facility: OTHER | Age: 54
End: 2024-02-20
Payer: COMMERCIAL

## 2024-02-20 DIAGNOSIS — F41.9 ANXIETY: Primary | ICD-10-CM

## 2024-02-20 RX ORDER — FLUOXETINE 10 MG/1
10 TABLET, FILM COATED ORAL DAILY
Qty: 30 TABLET | Refills: 1 | Status: SHIPPED | OUTPATIENT
Start: 2024-02-20 | End: 2024-03-12 | Stop reason: DRUGHIGH

## 2024-02-20 NOTE — TELEPHONE ENCOUNTER
We can hold off on heart monitor for now. Options for medication include hydroxyzine which could be used on an as needed basis or taking a daily medication such as sertraline or fluoxetine for management of anxiety. Please let me know her preferences.     Deborah Kuhn PA-C on 2/20/2024 at 8:58 AM

## 2024-02-20 NOTE — TELEPHONE ENCOUNTER
[facility]-patient was seen 02.19.24 and has questions about possible heart monitor    Please call and advise    Thank You    Tracey De Leon on 2/20/2024 at 7:58 AM

## 2024-02-20 NOTE — TELEPHONE ENCOUNTER
Called and spoke with patient. She states she read your [facility] message but couldn't figure out how to respond. She is wondering if you think a heart monitor is absolutely necessary and she talked with her  and would be interested in trying an anti-anxiety medication first and seeing if that helps, expressed concerns about weight gain side effects from medications and that is the main reason she was hesitant to try any. Please advise   Thank you!    Jenn Sprague LPN on 2/20/2024 at 8:51 AM

## 2024-02-20 NOTE — TELEPHONE ENCOUNTER
Called and spoke with patient. No questions or concerns. Patient verbalized understanding. Transferred to scheduling to make 1 month follow up appointment.    Jenn Sprague LPN on 2/20/2024 at 10:48 AM

## 2024-02-20 NOTE — TELEPHONE ENCOUNTER
Prozac sent. Should be taken regularly every day. Please have her schedule a follow up appointment in 1 month to see how things are going.   Deborah Kuhn PA-C on 2/20/2024 at 9:41 AM

## 2024-03-12 ENCOUNTER — OFFICE VISIT (OUTPATIENT)
Dept: FAMILY MEDICINE | Facility: OTHER | Age: 54
End: 2024-03-12
Attending: PHYSICIAN ASSISTANT
Payer: COMMERCIAL

## 2024-03-12 VITALS
HEART RATE: 77 BPM | RESPIRATION RATE: 12 BRPM | BODY MASS INDEX: 19.27 KG/M2 | WEIGHT: 122.8 LBS | SYSTOLIC BLOOD PRESSURE: 118 MMHG | HEIGHT: 67 IN | OXYGEN SATURATION: 99 % | DIASTOLIC BLOOD PRESSURE: 68 MMHG | TEMPERATURE: 97.9 F

## 2024-03-12 DIAGNOSIS — Z00.00 ROUTINE HISTORY AND PHYSICAL EXAMINATION OF ADULT: Primary | ICD-10-CM

## 2024-03-12 DIAGNOSIS — F41.9 ANXIETY: ICD-10-CM

## 2024-03-12 DIAGNOSIS — Z12.31 ENCOUNTER FOR SCREENING MAMMOGRAM FOR BREAST CANCER: ICD-10-CM

## 2024-03-12 DIAGNOSIS — Z12.4 CERVICAL CANCER SCREENING: ICD-10-CM

## 2024-03-12 PROCEDURE — 87624 HPV HI-RISK TYP POOLED RSLT: CPT | Mod: ZL | Performed by: PHYSICIAN ASSISTANT

## 2024-03-12 PROCEDURE — 99396 PREV VISIT EST AGE 40-64: CPT | Performed by: PHYSICIAN ASSISTANT

## 2024-03-12 PROCEDURE — 99213 OFFICE O/P EST LOW 20 MIN: CPT | Mod: 25 | Performed by: PHYSICIAN ASSISTANT

## 2024-03-12 PROCEDURE — G0123 SCREEN CERV/VAG THIN LAYER: HCPCS | Performed by: PHYSICIAN ASSISTANT

## 2024-03-12 RX ORDER — FLUOXETINE 10 MG/1
10 TABLET, FILM COATED ORAL DAILY
Qty: 90 TABLET | Refills: 3 | Status: CANCELLED | OUTPATIENT
Start: 2024-03-12

## 2024-03-12 SDOH — HEALTH STABILITY: PHYSICAL HEALTH: ON AVERAGE, HOW MANY DAYS PER WEEK DO YOU ENGAGE IN MODERATE TO STRENUOUS EXERCISE (LIKE A BRISK WALK)?: 7 DAYS

## 2024-03-12 ASSESSMENT — ANXIETY QUESTIONNAIRES
GAD7 TOTAL SCORE: 5
GAD7 TOTAL SCORE: 5
7. FEELING AFRAID AS IF SOMETHING AWFUL MIGHT HAPPEN: NOT AT ALL
5. BEING SO RESTLESS THAT IT IS HARD TO SIT STILL: SEVERAL DAYS
3. WORRYING TOO MUCH ABOUT DIFFERENT THINGS: SEVERAL DAYS
1. FEELING NERVOUS, ANXIOUS, OR ON EDGE: SEVERAL DAYS
4. TROUBLE RELAXING: SEVERAL DAYS
8. IF YOU CHECKED OFF ANY PROBLEMS, HOW DIFFICULT HAVE THESE MADE IT FOR YOU TO DO YOUR WORK, TAKE CARE OF THINGS AT HOME, OR GET ALONG WITH OTHER PEOPLE?: NOT DIFFICULT AT ALL
6. BECOMING EASILY ANNOYED OR IRRITABLE: NOT AT ALL
IF YOU CHECKED OFF ANY PROBLEMS ON THIS QUESTIONNAIRE, HOW DIFFICULT HAVE THESE PROBLEMS MADE IT FOR YOU TO DO YOUR WORK, TAKE CARE OF THINGS AT HOME, OR GET ALONG WITH OTHER PEOPLE: NOT DIFFICULT AT ALL
2. NOT BEING ABLE TO STOP OR CONTROL WORRYING: SEVERAL DAYS
GAD7 TOTAL SCORE: 5
7. FEELING AFRAID AS IF SOMETHING AWFUL MIGHT HAPPEN: NOT AT ALL

## 2024-03-12 ASSESSMENT — SOCIAL DETERMINANTS OF HEALTH (SDOH): HOW OFTEN DO YOU GET TOGETHER WITH FRIENDS OR RELATIVES?: ONCE A WEEK

## 2024-03-12 ASSESSMENT — PAIN SCALES - GENERAL: PAINLEVEL: NO PAIN (0)

## 2024-03-12 NOTE — PROGRESS NOTES
Preventive Care Visit  [facility]  Deborah Kuhn PA-C, Family Medicine  Mar 12, 2024    Assessment & Plan     1. Routine history and physical examination of adult  Preventative exams updated as below.  Has Cologuard kit to complete at home.  Reviewed stable lab work from last month.  Patient declined updating immunizations at this time.    2. Cervical cancer screening  - Pap Screen with HPV - recommended age 30 - 65 years    3. Encounter for screening mammogram for breast cancer  - MA Screen Bilateral w/Ciro; Future    4. Anxiety  Chronic, improving with addition of Prozac.  Feels she would like to increase dose, prescription for 20 mg as below.  Follow-up as needed.  - FLUoxetine (PROZAC) 20 MG capsule; Take 1 capsule (20 mg) by mouth daily  Dispense: 90 capsule; Refill: 1    Counseling  Appropriate preventive services were discussed with this patient, including applicable screening as appropriate for fall prevention, nutrition, physical activity, Tobacco-use cessation, weight loss and cognition.  Checklist reviewing preventive services available has been given to the patient.  Reviewed patient's diet, addressing concerns and/or questions.           No follow-ups on file.    Jeannie Parisi is a 53 year old, presenting for the following:  Physical         Health Care Directive  Patient does not have a Health Care Directive or Living Will: Discussed advance care planning with patient; however, patient declined at this time.    HPI  Here for annual physical.  Patient was recently seen in the clinic on 2/19 for evaluation of palpitations where physical evaluation was largely unrevealing.  Patient felt she was likely dealing with anxiety and was started on fluoxetine 10 mg once daily.  Has noticed significant improvement since taking this medication but does feel like there is room for further improvement.  She would like to increase the dose at this time.  Initially did notice some fatigue  but that has resolved.      Contraception: Ablation  Risk for STI?: No  Last pap: 2012, NIL  Any hx of abnormal paps:  No  Cholesterol/DM concerns/screening: UTD  Tobacco?: No  Calcium intake: Dietary  DEXA: NA  Last mammo: 04/2023  Colonoscopy: Has Cologuard at home  Immunizations: Declined flu and COVID          3/12/2024   General Health   How would you rate your overall physical health? Good   Feel stress (tense, anxious, or unable to sleep) To some extent   (!) STRESS CONCERN      3/12/2024   Nutrition   Three or more servings of calcium each day? Yes   Diet: Carbohydrate counting   How many servings of fruit and vegetables per day? (!) 2-3   How many sweetened beverages each day? 0-1         3/12/2024   Exercise   Days per week of moderate/strenous exercise 7 days         3/12/2024   Social Factors   Frequency of gathering with friends or relatives Once a week   Worry food won't last until get money to buy more No   Food not last or not have enough money for food? No   Do you have housing?  Yes   Are you worried about losing your housing? No   Lack of transportation? No   Unable to get utilities (heat,electricity)? No         4/19/2019   Fall Risk   Fallen 2 or more times in the past year? No          3/12/2024   Dental   Dentist two times every year? Yes         3/12/2024   TB Screening   Were you born outside of [place]?  (!) YES             Today's PHQ-2 Score:       3/12/2024    10:38 AM   PHQ-2 ( 1999 Pfizer)   Q1: Little interest or pleasure in doing things 0   Q2: Feeling down, depressed or hopeless 0   PHQ-2 Score 0   Q1: Little interest or pleasure in doing things Not at all   Q2: Feeling down, depressed or hopeless Not at all   PHQ-2 Score 0         3/12/2024   Substance Use   Alcohol more than 3/day or more than 7/wk No   Do you use any other substances recreationally? No     Social History     Tobacco Use    Smoking status: Never     Passive exposure: Never    Smokeless tobacco: Never   Vaping Use     "Vaping Use: Never used   Substance Use Topics    Alcohol use: No    Drug use: No     Comment: Drug use: No           3/22/2022   LAST FHS-7 RESULTS   1st degree relative breast or ovarian cancer No   Any relative bilateral breast cancer No   Any male have breast cancer No   Any ONE woman have BOTH breast AND ovarian cancer No   Any woman with breast cancer before 50yrs No   2 or more relatives with breast AND/OR ovarian cancer No        Mammogram Screening - Mammogram every 1-2 years updated in Health Maintenance based on mutual decision making          3/12/2024   One time HIV Screening   Previous HIV test? No         3/12/2024   STI Screening   New sexual partner(s) since last STI/HIV test? No     History of abnormal Pap smear: Last 3 Pap and HPV Results:         ASCVD Risk   The 10-year ASCVD risk score (Aaron JASSO, et al., 2019) is: 0.7%    Values used to calculate the score:      Age: 53 years      Sex: Female      Is Non- : No      Diabetic: No      Tobacco smoker: No      Systolic Blood Pressure: 118 mmHg      Is BP treated: No      HDL Cholesterol: 71 mg/dL      Total Cholesterol: 179 mg/dL           Reviewed and updated as needed this visit by Provider                    Past Medical History:   Diagnosis Date    Personal history of other medical treatment (CODE)      with two vaginal deliveries     Past Surgical History:   Procedure Laterality Date    EXTRACTION(S) DENTAL      No Comments Provided    LAPAROSCOPIC ABLATION ENDOMETRIOSIS      ,Endometrial Ablation    OTHER SURGICAL HISTORY      ,OTHER     OB History   No obstetric history on file.            Objective    Exam  /68   Pulse 77   Temp 97.9  F (36.6  C)   Resp 12   Ht 1.695 m (5' 6.75\")   Wt 55.7 kg (122 lb 12.8 oz)   SpO2 99%   BMI 19.38 kg/m     Estimated body mass index is 19.38 kg/m  as calculated from the following:    Height as of this encounter: 1.695 m (5' 6.75\").    " Weight as of this encounter: 55.7 kg (122 lb 12.8 oz).    Physical Exam  GENERAL: alert and no distress  EYES: Eyes grossly normal to inspection, PERRL and conjunctivae and sclerae normal  HENT: ear canals and TM's normal, nose and mouth without ulcers or lesions  NECK: no adenopathy, no asymmetry, masses, or scars  RESP: lungs clear to auscultation - no rales, rhonchi or wheezes  CV: regular rate and rhythm, normal S1 S2, no S3 or S4, no murmur, click or rub, no peripheral edema   (female): normal female external genitalia, normal urethral meatus, normal vaginal mucosa  MS: no gross musculoskeletal defects noted, no edema  SKIN: no suspicious lesions or rashes  NEURO: Normal strength and tone, mentation intact and speech normal  PSYCH: mentation appears normal, affect normal/bright      Signed Electronically by: Deborah Kuhn PA-C    Answers submitted by the patient for this visit:  CIARA-7 (Submitted on 3/12/2024)  CIARA 7 TOTAL SCORE: 5

## 2024-03-15 LAB
BKR LAB AP GYN ADEQUACY: NORMAL
BKR LAB AP GYN INTERPRETATION: NORMAL
BKR LAB AP HPV REFLEX: NORMAL
BKR LAB AP PREVIOUS ABNORMAL: NORMAL
PATH REPORT.COMMENTS IMP SPEC: NORMAL
PATH REPORT.COMMENTS IMP SPEC: NORMAL
PATH REPORT.RELEVANT HX SPEC: NORMAL

## 2024-03-19 LAB
HUMAN PAPILLOMA VIRUS 16 DNA: NEGATIVE
HUMAN PAPILLOMA VIRUS 18 DNA: NEGATIVE
HUMAN PAPILLOMA VIRUS FINAL DIAGNOSIS: NORMAL
HUMAN PAPILLOMA VIRUS OTHER HR: NEGATIVE

## 2024-03-29 ENCOUNTER — TELEPHONE (OUTPATIENT)
Dept: FAMILY MEDICINE | Facility: OTHER | Age: 54
End: 2024-03-29
Payer: COMMERCIAL

## 2024-03-29 NOTE — TELEPHONE ENCOUNTER
Possibly side effect of Prozac vs other underlying cause. I would have her follow up in the clinic to determine if additional evaluation is indicated.   Deborah Kuhn PA-C on 3/29/2024 at 2:04 PM

## 2024-03-29 NOTE — TELEPHONE ENCOUNTER
Called and spoke with patient.    Patient states she had an increase in Fluoxetine about a 1 1/2 week ago.  States since then her left side of her chest has been burning on and off.  States it starts to come on in the morning on and off, states when she goes to bed she is fine.    States that she has never had acid reflux before  States she did buy some antacid and has been taking that but it does not help.    States when it comes on it scares her so she is panicky which causes some shortness of breath.  States she rates the burning feeling a 6/10.    Denies any dizziness , shortness of breath.    States did work out today and did fine.  States does have that burning feeling now.  States it comes on and slowly goes away.    Patient is wondering if it is from the prozac or is it something else more concerning.    Informed patient will route message to Deborah Kuhn for review and consideration but if anything worsens or changes in the meantime to present to ED.  Loly Banuelos RN on 3/29/2024 at 2:02 PM

## 2024-03-29 NOTE — TELEPHONE ENCOUNTER
Called and informed patient of Deborah Velázquez response and patient verbalized understanding.    Patient will present to ED if worsening /concerning symptoms.    Loly Banuelos RN on 3/29/2024 at 2:14 PM

## 2024-03-29 NOTE — TELEPHONE ENCOUNTER
Patient started a prescription of fluoxetine about a moth ago and is how have a lot of heart burn.  Wants to talk to the nurse.        Janeth Pastrana on 3/29/2024 at 1:24 PM

## 2024-04-08 ENCOUNTER — OFFICE VISIT (OUTPATIENT)
Dept: FAMILY MEDICINE | Facility: OTHER | Age: 54
End: 2024-04-08
Attending: PHYSICIAN ASSISTANT
Payer: COMMERCIAL

## 2024-04-08 ENCOUNTER — HOSPITAL ENCOUNTER (OUTPATIENT)
Dept: GENERAL RADIOLOGY | Facility: OTHER | Age: 54
Discharge: HOME | End: 2024-04-08
Attending: PHYSICIAN ASSISTANT
Payer: COMMERCIAL

## 2024-04-08 VITALS
RESPIRATION RATE: 14 BRPM | WEIGHT: 123 LBS | BODY MASS INDEX: 19.3 KG/M2 | HEIGHT: 67 IN | DIASTOLIC BLOOD PRESSURE: 70 MMHG | OXYGEN SATURATION: 98 % | TEMPERATURE: 98.6 F | SYSTOLIC BLOOD PRESSURE: 106 MMHG | HEART RATE: 92 BPM

## 2024-04-08 DIAGNOSIS — R07.89 CHEST DISCOMFORT: ICD-10-CM

## 2024-04-08 DIAGNOSIS — F41.9 ANXIETY: Primary | ICD-10-CM

## 2024-04-08 PROCEDURE — 71046 X-RAY EXAM CHEST 2 VIEWS: CPT

## 2024-04-08 PROCEDURE — 99213 OFFICE O/P EST LOW 20 MIN: CPT | Performed by: PHYSICIAN ASSISTANT

## 2024-04-08 RX ORDER — FLUOXETINE 10 MG/1
10 TABLET, FILM COATED ORAL DAILY
Qty: 30 TABLET | Refills: 0 | Status: SHIPPED | OUTPATIENT
Start: 2024-04-08 | End: 2024-04-30

## 2024-04-08 ASSESSMENT — PAIN SCALES - GENERAL: PAINLEVEL: MODERATE PAIN (4)

## 2024-04-08 NOTE — PROGRESS NOTES
Assessment & Plan     1. Anxiety  Developing side effects of heartburn/reflux symptoms after increasing dose to 20 mg.  Patient did have noticeable improvement with 10 mg so she would like to go back down to that dose to see if side effects resolve.  Okay to use over-the-counter medication such as famotidine or omeprazole for symptomatic management in the meantime.  Follow-up in 2 weeks.  - FLUoxetine (PROZAC) 10 MG tablet; Take 1 tablet (10 mg) by mouth daily  Dispense: 30 tablet; Refill: 0    2. Chest discomfort  Vitals and exam stable.  Chest x-ray unremarkable.  Reviewed previous evaluation including lab work and EKG that was stable.  Offered repeat EKG, deferred today.  Likely symptoms related to side effects of fluoxetine.  Plan as above.  - XR Chest 2 Views; Future      No follow-ups on file.    Jeannie Parisi is a 53 year old, presenting for the following health issues:  Medication Problem    History of Present Illness       Reason for visit:  Burning chest pain    She eats 2-3 servings of fruits and vegetables daily.She consumes 2 sweetened beverage(s) daily.She exercises with enough effort to increase her heart rate 30 to 60 minutes per day.  She exercises with enough effort to increase her heart rate 7 days per week.   She is taking medications regularly.     Here for follow-up on anxiety and possible medication side effects.  Patient was initially started on fluoxetine for management of anxiety on 2/20.  She was seen in follow-up on 3/12 where she had reported noticeable improvements in her anxiety levels but felt that there was room for further improvement so she wanted to increase her dose to 20 mg.  Approximately a week after starting the increased dose she developed an epigastric burning sensation.  Associated nausea mainly in the morning.  Tried to manage with over-the-counter Prevacid and other over-the-counter antacids without improvement.  Reports sometimes the burning sensation will radiate  "up into her chest.  Has been noticing some increased difficulties with breathing with exertion.  No specific chest pains or pressures, palpitations, dizziness lightheadedness, syncope, headaches, vision changes.  Of note patient did recently have cardiac evaluation for palpitations in 2024 which was largely unrevealing.  Lab work and EKG was stable.      PAST MEDICAL HISTORY:   Past Medical History:   Diagnosis Date    Personal history of other medical treatment (CODE)      with two vaginal deliveries       PAST SURGICAL HISTORY:   Past Surgical History:   Procedure Laterality Date    EXTRACTION(S) DENTAL      No Comments Provided    LAPAROSCOPIC ABLATION ENDOMETRIOSIS      ,Endometrial Ablation    OTHER SURGICAL HISTORY      ,,OTHER       FAMILY HISTORY:   Family History   Problem Relation Age of Onset    Family History Negative Mother         Good Health    Family History Negative Father         Good Health    Breast Cancer No family hx of         Cancer-breast       SOCIAL HISTORY:   Social History     Tobacco Use    Smoking status: Never     Passive exposure: Never    Smokeless tobacco: Never   Substance Use Topics    Alcohol use: No        Allergies   Allergen Reactions    Seasonal Allergies Other (See Comments)     Sneezing, watery eyes     Current Outpatient Medications   Medication Sig Dispense Refill    FLUoxetine (PROZAC) 10 MG tablet Take 1 tablet (10 mg) by mouth daily 30 tablet 0    FLUoxetine (PROZAC) 20 MG capsule Take 1 capsule (20 mg) by mouth daily 90 capsule 1     No current facility-administered medications for this visit.           Objective    /70   Pulse 92   Temp 98.6  F (37  C)   Resp 14   Ht 1.695 m (5' 6.75\")   Wt 55.8 kg (123 lb)   SpO2 98%   BMI 19.41 kg/m    Body mass index is 19.41 kg/m .  Physical Exam   General: Pleasant, in no apparent distress.  Eyes: Sclera are white and conjunctiva are clear bilaterally. Lacrimal apparatus free of erythema, " edema, and discharge bilaterally.  Ears: External ears without erythema or edema.   Nose: External nose is symmetrical and free of lesions and deformities.   Cardiovascular: Regular rate and rhythm with S1 equal to S2. No murmurs, friction rubs, or gallops.   Respiratory: Lungs are resonant and clear to auscultation bilaterally. No wheezes, crackles, or rhonchi.  Skin: No jaundice, pallor, rashes, or lesions.  Psych: Appropriate mood and affect.    Results for orders placed or performed during the hospital encounter of 04/08/24   XR Chest 2 Views     Status: None    Narrative    Exam:  XR CHEST 2 VIEWS    HISTORY: Chest discomfort.    COMPARISON:  None.    FINDINGS:     The cardiomediastinal contours are normal.      No focal consolidation, effusion, or pneumothorax.      No acute osseous abnormality.       Impression    IMPRESSION:      No acute cardiopulmonary process.      STEVEN LOUISE MD         SYSTEM ID:  [id number]       Signed Electronically by: Deborah Kuhn PA-C

## 2024-04-08 NOTE — NURSING NOTE
Patient presents to clinic with chest burning since increasing her fluoxetine dose.  Adrianna Shell LPN ....................  4/8/2024   2:36 PM  EXT [id number]

## 2024-04-16 ENCOUNTER — HOSPITAL ENCOUNTER (OUTPATIENT)
Dept: MAMMOGRAPHY | Facility: OTHER | Age: 54
Discharge: HOME | End: 2024-04-16
Attending: PHYSICIAN ASSISTANT | Admitting: PHYSICIAN ASSISTANT
Payer: COMMERCIAL

## 2024-04-16 DIAGNOSIS — Z12.31 ENCOUNTER FOR SCREENING MAMMOGRAM FOR BREAST CANCER: ICD-10-CM

## 2024-04-16 PROCEDURE — 77063 BREAST TOMOSYNTHESIS BI: CPT

## 2024-04-22 ENCOUNTER — HOSPITAL ENCOUNTER (OUTPATIENT)
Dept: ULTRASOUND IMAGING | Facility: OTHER | Age: 54
Discharge: HOME | End: 2024-04-22
Attending: PHYSICIAN ASSISTANT | Admitting: PHYSICIAN ASSISTANT
Payer: COMMERCIAL

## 2024-04-22 DIAGNOSIS — R92.8 ABNORMAL FINDING ON BREAST IMAGING: ICD-10-CM

## 2024-04-22 PROCEDURE — 76642 ULTRASOUND BREAST LIMITED: CPT | Mod: LT

## 2024-04-30 ENCOUNTER — TELEPHONE (OUTPATIENT)
Dept: FAMILY MEDICINE | Facility: OTHER | Age: 54
End: 2024-04-30
Payer: COMMERCIAL

## 2024-04-30 DIAGNOSIS — F41.9 ANXIETY: ICD-10-CM

## 2024-04-30 RX ORDER — FLUOXETINE 10 MG/1
10 TABLET, FILM COATED ORAL DAILY
Qty: 90 TABLET | Refills: 3 | Status: SHIPPED | OUTPATIENT
Start: 2024-04-30

## 2024-04-30 NOTE — TELEPHONE ENCOUNTER
Patient is calling to let [facility] know that the 10 mg of fluoxetine is working much better than the 20 mg. When patient refills the medication, she is requesting the 10 mg.     Danya Beth on 4/30/2024 at 8:55 AM

## 2024-05-10 ENCOUNTER — HOSPITAL ENCOUNTER (OUTPATIENT)
Dept: ULTRASOUND IMAGING | Facility: OTHER | Age: 54
Discharge: HOME | End: 2024-05-10
Attending: PHYSICIAN ASSISTANT | Admitting: PHYSICIAN ASSISTANT
Payer: COMMERCIAL

## 2024-05-10 DIAGNOSIS — R92.8 ABNORMAL FINDING ON BREAST IMAGING: ICD-10-CM

## 2024-05-10 DIAGNOSIS — N64.4 BREAST PAIN, LEFT: ICD-10-CM

## 2024-05-10 PROCEDURE — 88112 CYTOPATH CELL ENHANCE TECH: CPT

## 2024-05-10 PROCEDURE — 76942 ECHO GUIDE FOR BIOPSY: CPT

## 2024-05-10 PROCEDURE — 250N000009 HC RX 250: Performed by: STUDENT IN AN ORGANIZED HEALTH CARE EDUCATION/TRAINING PROGRAM

## 2024-05-10 RX ORDER — LIDOCAINE HYDROCHLORIDE AND EPINEPHRINE 10; 10 MG/ML; UG/ML
20 INJECTION, SOLUTION INFILTRATION; PERINEURAL ONCE
Status: DISCONTINUED | OUTPATIENT
Start: 2024-05-10 | End: 2024-05-11 | Stop reason: HOSPADM

## 2024-05-10 RX ORDER — LIDOCAINE HYDROCHLORIDE 10 MG/ML
20 INJECTION, SOLUTION INFILTRATION; PERINEURAL ONCE
Status: COMPLETED | OUTPATIENT
Start: 2024-05-10 | End: 2024-05-10

## 2024-05-10 RX ADMIN — LIDOCAINE HYDROCHLORIDE 5 ML: 10 INJECTION, SOLUTION EPIDURAL; INFILTRATION; INTRACAUDAL; PERINEURAL at 08:31

## 2024-05-10 NOTE — DISCHARGE INSTRUCTIONS
"NEEDLE BIOPSY BREAST    Activity: Rest the remainder of the day. You may resume normal activity after the next day. Avoid any vigorous/strenuous physical activity for 24 hours.    Comfort: If you have discomfort or tenderness at the site you may take your usual or recommended pain medication. Do not take aspirin the day of the procedure or for 48 hours following the biopsy.    Diet: You may resume your usual diet.    Care of site: Leave ice pack in place for 4 hours, or until it is no longer cold. The ice pack is reusable and may be refrozen.  Keep your bra and the dressing on for 24 hours. Then you may remove the bandage and shower. If there are steri-strips you may remove them in 3 to 5 days.  You may have some discomfort and a small amount of bruising where the biopsy was performed. This is normal. For several days or even a couple of weeks, you may have tenderness or \"twinges\" and a tiny bump where the needle went into the skin. This can be bothersome, but is not abnormal. You can use warm moist washcloths, as this may help. Do Not Use A Heating Pad.    RETURN TO THE EMERGENCY ROOM FOR:   Shortness of breath   Rapid heart rate   If pain becomes worse    Call Your Doctor For:    A fever over 101 degrees   Increased redness, increased swelling, and/or persistent drainage/discomfort  around the site    Other: At the end of your breast biopsy, a tiny titanium clip will be inserted through the biopsy needle and placed at the biopsy site within your breast. The marker provides a landmark of the biopsy for further mammograms or surgical procedures. This marker is MRI compatible and poses no known health risks.    You will be receiving a letter in the mail from [facility] with your biopsy results.  In 6 months a mammogram will be needed to establish a new baseline and to recheck the area where the biopsy occurred. Our radiology department will call you to schedule an appointment.    For " questions, problems or concerns, contact the Radiology Department at [phone].

## 2024-05-10 NOTE — PROGRESS NOTES
Patient here for ultrasound guided aspiration of left breast.  Procedure reviewed with patient by writer and radiologist, questions answered.  Time out performed prior to aspiration.  Aspiration successfully completed by radiologist.  Pressure held to biopsy site for 5 minutes.  Medipore dressing applied.   Discharge instructions reviewed with patient, patient verbalizes understanding of instructions.  Discharged to home in stable condition with no evidence of bleeding from biopsy site.   Shannan Penaloza RN.

## 2024-05-14 LAB
PATH REPORT.COMMENTS IMP SPEC: NORMAL
PATH REPORT.FINAL DX SPEC: NORMAL
PHOTO IMAGE: NORMAL

## 2024-05-23 ENCOUNTER — OFFICE VISIT (OUTPATIENT)
Dept: SURGERY | Facility: OTHER | Age: 54
End: 2024-05-23
Attending: SURGERY
Payer: COMMERCIAL

## 2024-05-23 VITALS
SYSTOLIC BLOOD PRESSURE: 90 MMHG | RESPIRATION RATE: 14 BRPM | HEART RATE: 76 BPM | OXYGEN SATURATION: 98 % | BODY MASS INDEX: 19.41 KG/M2 | DIASTOLIC BLOOD PRESSURE: 60 MMHG | WEIGHT: 123 LBS | TEMPERATURE: 98.6 F

## 2024-05-23 DIAGNOSIS — Z80.41 FAMILY HISTORY OF MALIGNANT NEOPLASM OF OVARY: ICD-10-CM

## 2024-05-23 DIAGNOSIS — N60.02 BREAST CYST, LEFT: Primary | ICD-10-CM

## 2024-05-23 DIAGNOSIS — Z80.3 FAMILY HISTORY OF BREAST CANCER: ICD-10-CM

## 2024-05-23 PROCEDURE — 99204 OFFICE O/P NEW MOD 45 MIN: CPT | Performed by: SURGERY

## 2024-05-23 ASSESSMENT — PAIN SCALES - GENERAL: PAINLEVEL: MILD PAIN (2)

## 2024-05-23 NOTE — PROGRESS NOTES
Primary Care Physician: Deborah Kuhn PA-C    I was requested to see this patient in consultation by Deborah Kuhn PA-C for evaluation of left breast asymmetry noted on recent screening mammogram. A copy of this note will be sent to Deborah Kuhn PA-C.    HPI:   The patient is 53 year old female with new asymmetry noted in the left breast on recent mammogram. US showed two adjacent hypoechoic structures. 4 o'clock position, 3 cm from the nipple. Previously patient had cysts aspirated from that area. The patient hasn't noted any skin, nipple or breast changes. No previous breast cancer. Previous 2017 left breast aspiration-no cells.  Family history of breast cancer and ovarian cancer on her dad's side. 2/3 paternal aunts, a cousin and paternal grandmother. Dad with lung cancer. The patient had biopsy performed that showed cyst fluid, macrophages, bland ductal cells.        CONSULTATION ASSESSMENT AND PLAN/RECOMMENDATIONS:   I discussed with the patient the pathophysiology of microcalcifications and breast disease. We specifically discussed that most abnormalities seen on mammogram and US are not breast cancer. I explained that fibrocystic change and breast cysts are not a precancerous condition and that it doesn't increase future risk for breast cancer. I recommended a 6 month left  breast mammogram and US to follow up breast changes after the recent aspiration.   I discussed with the patient that family history can increase her lifetime risk for breast cancer. We discussed current NCCN guidelines for high risk screening and surveillance in patients with a greater than 20 % lifetime risk of breast cancer.  We discussed that her estimated lifetime breast cancer risk is estimated to be 20.8 % by the AMA model with the patient's estimates. This is elevated. It is recommended that she consider high risk screening strategies. Currently, this would include yearly mammogram and yearly MRI, alternating the  studies every 6 months. She wishes to proceed with high risk screening.  Due for left US, mammogram in 6 months and MRI as well at that time.    We discussed the option of a genetic evaluation for further information about her breast cancer risk and the risks she may have for other cancers. The patient expressed understanding and denies further questions at this time. She would like to pursue the genetic counseling referral. We will watch for this appointment and any testing results. If results will change her screening recommendations or risk, we will see her in the office to discuss. The patient will call with questions or concerns.     REVIEW OF SYSTEMS  GENERAL: No fevers or chills. Denies fatigue, recent weight loss.  HEENT: No sinus drainage. No changes with vision or hearing. No difficulty swallowing.   LYMPHATICS:  No swollen nodes in axilla, neck or groin.  CARDIOVASCULAR: Denies chest pain, palpitations and dyspnea on exertion.  PULMONARY: No shortness of breath or cough. No increase in sputum production.  GI: Denies melena, bright red blood in stools. No hematemesis. No constipation or diarrhea.  : No dysuria or hematuria.  SKIN: No recent rashes or ulcers.   HEMATOLOGY:  No history of easy bruising or bleeding.  ENDOCRINE:  No history of diabetes or thyroid problems.  NEUROLOGY:  No history of seizures or headaches. No motor or sensory changes.  BREAST:  As above.  Past Medical History:   Diagnosis Date    Personal history of other medical treatment (CODE)      with two vaginal deliveries     Past Surgical History:   Procedure Laterality Date    EXTRACTION(S) DENTAL      No Comments Provided    LAPAROSCOPIC ABLATION ENDOMETRIOSIS      ,Endometrial Ablation    OTHER SURGICAL HISTORY      ,[id number],OTHER     Current Outpatient Medications   Medication Sig Dispense Refill    FLUoxetine (PROZAC) 10 MG tablet Take 1 tablet (10 mg) by mouth daily 90 tablet 3     No current facility-administered  medications for this visit.     Allergies   Allergen Reactions    Seasonal Allergies Other (See Comments)     Sneezing, watery eyes     Family History   Problem Relation Age of Onset    Family History Negative Mother         Good Health    Family History Negative Father         Good Health    Breast Cancer No family hx of         Cancer-breast     Social History     Socioeconomic History    Marital status:      Spouse name: None    Number of children: None    Years of education: None    Highest education level: None   Tobacco Use    Smoking status: Never     Passive exposure: Never    Smokeless tobacco: Never   Vaping Use    Vaping status: Never Used   Substance and Sexual Activity    Alcohol use: No    Drug use: No     Comment: Drug use: No    Sexual activity: Yes     Partners: Male   Social History Narrative     with two children     at [employer] and a local [employer].      works at [employer] as      Social Determinants of Health     Financial Resource Strain: Low Risk  (4/8/2024)    Financial Resource Strain     Within the past 12 months, have you or your family members you live with been unable to get utilities (heat, electricity) when it was really needed?: No   Food Insecurity: Low Risk  (4/8/2024)    Food Insecurity     Within the past 12 months, did you worry that your food would run out before you got money to buy more?: No     Within the past 12 months, did the food you bought just not last and you didn t have money to get more?: No   Transportation Needs: Low Risk  (4/8/2024)    Transportation Needs     Within the past 12 months, has lack of transportation kept you from medical appointments, getting your medicines, non-medical meetings or appointments, work, or from getting things that you need?: No   Physical Activity: Unknown (3/12/2024)    Exercise Vital Sign     Days of Exercise per Week: 7 days   Stress: Stress Concern Present (3/12/2024)    [place] [employer] - Occupational Stress Questionnaire     Feeling of Stress : To some extent   Social Connections: Unknown (3/12/2024)    Social Connection and Isolation Panel [NHANES]     Frequency of Social Gatherings with Friends and Family: Once a week   Interpersonal Safety: Low Risk  (5/23/2024)    Interpersonal Safety     Do you feel physically and emotionally safe where you currently live?: Yes     Within the past 12 months, have you been hit, slapped, kicked or otherwise physically hurt by someone?: No     Within the past 12 months, have you been humiliated or emotionally abused in other ways by your partner or ex-partner?: No   Housing Stability: Low Risk  (4/8/2024)    Housing Stability     Do you have housing? : Yes     Are you worried about losing your housing?: No     The above history was reviewed and updated today, 5/23/2024  PHYSICAL EXAM  BP 90/60 (BP Location: Right arm, Patient Position: Sitting, Cuff Size: Adult Regular)   Pulse 76   Temp 98.6  F (37  C) (Tympanic)   Resp 14   Wt 55.8 kg (123 lb)   SpO2 98%   BMI 19.41 kg/m    GENERAL: Healthy appearing patient in no acute distress. Pleasant and cooperative with exam and interview.   HEENT:Head-normocephalic. Eyes-no scleral icterus. Nose-no nasal drainage. No lesions. Mouth-oral mucosa pink and moist, no lesions.  NECK: Supple. No thyroid nodules. Trachea midline.  LYMPHATICS:  No cervical, axillary or supraclavicular adenopathy.  CV: Regular rate and rhythm, no murmurs. No peripheral edema.  LUNGS:  No respiratory distress. Clear bilaterally to auscultation.  ABDOMEN: Non distended. Bowel sounds active. Soft, non-tender, no hepatosplenomegaly or hernias. No peritoneal signs.  SKIN: Pink, warm and dry. No jaundice. No rash.  NEURO:  Cranial nerves II-XII grossly intact. Alert and oriented.  PSYCH: Appropriate mood and affect.  BREAST: Breasts were examined in the seated and supine position. No mass noted bilaterally. No  nipple changes or discharge bilaterally. Post biopsy changes noted left breast. Resolving ecchymosis with no sign of infection. Appropriate tenderness noted.    IMAGING/LAB  I personally reviewed patient's recent mammogram, US and biopsy images and reports and pathology report.

## 2024-05-23 NOTE — NURSING NOTE
"Chief Complaint   Patient presents with    Consult     Left breast       Initial BP 90/60 (BP Location: Right arm, Patient Position: Sitting, Cuff Size: Adult Regular)   Pulse 76   Temp 98.6  F (37  C) (Tympanic)   Resp 14   Wt 55.8 kg (123 lb)   SpO2 98%   BMI 19.41 kg/m   Estimated body mass index is 19.41 kg/m  as calculated from the following:    Height as of 4/8/24: 1.695 m (5' 6.75\").    Weight as of this encounter: 55.8 kg (123 lb).  Medication Reconciliation: complete    At what age did you start menopause? Ablation  What age did your menstrual cycle start? 18  Are you on or have you ever taken any hormone replacement or birth control? Birth control  How many children do you have? 2  How old were you when your first child was born? 28  Did you breast feed? no  Do you have a family history of breast cancer? 2 Paternal aunts- 60-70  Maren Celeste LPN..........5/23/2024  9:47 AM  "

## 2024-05-28 NOTE — PATIENT INSTRUCTIONS
You will be due for mammogram and US of the left breast in 6 months as well as a breast MRI for high risk screening, you will get a call to schedule those.   You will get a call to schedule a virtual genetics discussion. We will watch for that visit and any testing results. If the results will change you screening recommendations, we will see you in the office for discussion.

## 2024-05-30 ENCOUNTER — PATIENT OUTREACH (OUTPATIENT)
Dept: ONCOLOGY | Facility: CLINIC | Age: 54
End: 2024-05-30
Payer: COMMERCIAL

## 2024-05-30 NOTE — PROGRESS NOTES
New Patient Oncology Nurse Navigator Note     Referring provider: Ailin Iniguez MD      Referring Clinic/Organization: [facility]      Referred to (specialty:) Genetic Counseling     Requested provider (if applicable): NA     Date Referral Received: May 30, 2024     Evaluation for:      Z80.3 (ICD-10-CM) - Family history of breast cancer   Z80.41 (ICD-10-CM) - Family history of malignant neoplasm of ovary     Payor: BCBS / Plan: [place] / Product Type: Indemnity /     May 30, 2024  Referral received and reviewed.   Sent to NPS to process.    Vivi LANDRUMN, RN   Oncology Nurse Navigator   [facility]   [phone] / [phone]

## 2024-10-07 ENCOUNTER — HOSPITAL ENCOUNTER (OUTPATIENT)
Dept: MRI IMAGING | Facility: OTHER | Age: 54
Discharge: HOME | End: 2024-10-07
Attending: SURGERY | Admitting: SURGERY
Payer: COMMERCIAL

## 2024-10-07 DIAGNOSIS — Z80.3 FAMILY HISTORY OF BREAST CANCER: ICD-10-CM

## 2024-10-07 DIAGNOSIS — Z91.89 AT HIGH RISK FOR BREAST CANCER: ICD-10-CM

## 2024-10-07 DIAGNOSIS — Z12.39 SCREENING FOR BREAST CANCER USING NON-MAMMOGRAM MODALITY: ICD-10-CM

## 2024-10-07 PROCEDURE — A9575 INJ GADOTERATE MEGLUMI 0.1ML: HCPCS | Performed by: SURGERY

## 2024-10-07 PROCEDURE — 77049 MRI BREAST C-+ W/CAD BI: CPT

## 2024-10-07 PROCEDURE — 255N000002 HC RX 255 OP 636: Performed by: SURGERY

## 2024-10-07 RX ORDER — GADOTERATE MEGLUMINE 376.9 MG/ML
15 INJECTION INTRAVENOUS ONCE
Status: COMPLETED | OUTPATIENT
Start: 2024-10-07 | End: 2024-10-07

## 2024-10-07 RX ADMIN — GADOTERATE MEGLUMINE 11 ML: 376.9 INJECTION INTRAVENOUS at 11:35

## 2024-10-09 ENCOUNTER — TELEPHONE (OUTPATIENT)
Dept: SURGERY | Facility: OTHER | Age: 54
End: 2024-10-09
Payer: COMMERCIAL

## 2024-10-09 NOTE — TELEPHONE ENCOUNTER
Patient called wanted to talk through verbage on MRI report (benign findings), also wanted to know about the findings of cysts bilaterally and why they weren't noted bilaterally on last mammogram.  We talked about how extremely dense breasts can make it hard to see through breast tissue.  She was also wondering about the marked background enhancement on MRI.  She states that she no longer has cycles but does have her ovaries.  We talked about how she might have just been in a more active part of her cycle. She noted that her breasts have been particularily tender lately.  We talked about the hormone changes during perimenopause and how that can affect breast tenderness. We talked about ways to lessen breast pain. She is scheduled for unilateral mammogram and ultrasound in November to follow up breast aspiration.  Patient knows she can let me know if she wants to see Dr Iniguez at any time.  She will call with any questions or concerns.

## 2024-11-07 ENCOUNTER — HOSPITAL ENCOUNTER (OUTPATIENT)
Dept: MAMMOGRAPHY | Facility: OTHER | Age: 54
Discharge: HOME | End: 2024-11-07
Attending: SURGERY
Payer: COMMERCIAL

## 2024-11-07 ENCOUNTER — HOSPITAL ENCOUNTER (OUTPATIENT)
Dept: ULTRASOUND IMAGING | Facility: OTHER | Age: 54
Discharge: HOME | End: 2024-11-07
Attending: SURGERY
Payer: COMMERCIAL

## 2024-11-07 DIAGNOSIS — Z09 FOLLOW-UP EXAM, 3-6 MONTHS SINCE PREVIOUS EXAM: ICD-10-CM

## 2024-11-07 DIAGNOSIS — R92.8 ABNORMAL FINDING ON BREAST IMAGING: ICD-10-CM

## 2024-11-07 PROCEDURE — 76642 ULTRASOUND BREAST LIMITED: CPT | Mod: LT

## 2024-11-07 PROCEDURE — 77065 DX MAMMO INCL CAD UNI: CPT | Mod: LT

## 2025-02-10 ENCOUNTER — PATIENT OUTREACH (OUTPATIENT)
Dept: CARE COORDINATION | Facility: CLINIC | Age: 55
End: 2025-02-10
Payer: COMMERCIAL

## 2025-02-24 ENCOUNTER — PATIENT OUTREACH (OUTPATIENT)
Dept: CARE COORDINATION | Facility: CLINIC | Age: 55
End: 2025-02-24
Payer: COMMERCIAL

## 2025-04-26 ENCOUNTER — HEALTH MAINTENANCE LETTER (OUTPATIENT)
Age: 55
End: 2025-04-26

## 2025-05-01 DIAGNOSIS — F41.9 ANXIETY: ICD-10-CM

## 2025-05-01 RX ORDER — FLUOXETINE 10 MG/1
10 TABLET, FILM COATED ORAL DAILY
Qty: 90 TABLET | Refills: 3 | Status: SHIPPED | OUTPATIENT
Start: 2025-05-01

## 2025-05-01 NOTE — TELEPHONE ENCOUNTER
Reason for call: Medication or medication refill    Patient schedule a Annual Wellness but it is not till the middle of May and she will run out of her Prescription by that time, wondering what you would like her to do       Name of medication requested: Prozac    How many days of medication do you have left? 7     What pharmacy do you use? [facility], [place]    Preferred method for responding to this message: Telephone Call    Phone number patient can be reached at: Cell number on file:    Telephone Information:   Mobile [phone]       If we cannot reach you directly, may we leave a detailed response at the number you provided? Yes  Sonu Hahn on 5/1/2025 at 8:25 AM

## 2025-05-01 NOTE — TELEPHONE ENCOUNTER
[facility] sent Rx request for the following:      Requested Prescriptions   Pending Prescriptions Disp Refills    FLUoxetine (PROZAC) 10 MG tablet 90 tablet 3     Sig: Take 1 tablet (10 mg) by mouth daily.       SSRIs Protocol Failed - 5/1/2025  9:19 AM      Last Prescription Date:   04/30/2024  Last Fill Qty/Refills:         90, R-3    Last Office Visit:              04/08/2024   Future Office visit:           05/16/2025  Unable to complete prescription refill per RN Medication Refill Policy.    Angie Fontaine RN on 5/1/2025 at 9:22 AM

## 2025-05-05 ENCOUNTER — TELEPHONE (OUTPATIENT)
Dept: FAMILY MEDICINE | Facility: OTHER | Age: 55
End: 2025-05-05
Payer: COMMERCIAL

## 2025-05-05 NOTE — TELEPHONE ENCOUNTER
Reason for call: Medication or medication refill    Have you contacted your pharmacy regarding this refill? yes    If No, direct the patient to contact the Pharmacy and discontinue telephone note using Erroneous Encounter.  If Yes, continue.    Name of medication requested: Zoloft    How many days of medication do you have left? 3 days    What pharmacy do you use? [facility]    Preferred method for responding to this message: Telephone Call    Phone number patient can be reached at: Home number on file [phone] (home)    If we cannot reach you directly, may we leave a detailed response at the number you provided? Shelley Banuelos on 5/5/2025 at 7:48 AM

## 2025-05-05 NOTE — TELEPHONE ENCOUNTER
Informed Patient that her fluoxetine refill is ready for  at [facility].  Patient verbalized understanding.  Angie Fontaine RN on 5/5/2025 at 11:16 AM

## 2025-05-05 NOTE — TELEPHONE ENCOUNTER
Confirmed with [facility] Fluoxetine refill is ready for  - Left message on answering machine to return call to Care Team Unit 4 Nurse.  Angie Fontaine RN on 5/5/2025 at 10:58 AM

## 2025-05-22 ENCOUNTER — OFFICE VISIT (OUTPATIENT)
Dept: FAMILY MEDICINE | Facility: OTHER | Age: 55
End: 2025-05-22
Attending: PHYSICIAN ASSISTANT
Payer: COMMERCIAL

## 2025-05-22 VITALS
WEIGHT: 118 LBS | BODY MASS INDEX: 18.62 KG/M2 | HEART RATE: 78 BPM | TEMPERATURE: 98.1 F | DIASTOLIC BLOOD PRESSURE: 60 MMHG | OXYGEN SATURATION: 98 % | SYSTOLIC BLOOD PRESSURE: 96 MMHG | RESPIRATION RATE: 18 BRPM

## 2025-05-22 DIAGNOSIS — F41.9 ANXIETY: ICD-10-CM

## 2025-05-22 DIAGNOSIS — N95.1 MENOPAUSAL SYNDROME (HOT FLASHES): ICD-10-CM

## 2025-05-22 DIAGNOSIS — Z00.00 ROUTINE HISTORY AND PHYSICAL EXAMINATION OF ADULT: Primary | ICD-10-CM

## 2025-05-22 DIAGNOSIS — Z53.20 COLON CANCER SCREENING DECLINED: ICD-10-CM

## 2025-05-22 RX ORDER — FLUOXETINE 20 MG/1
20 TABLET, FILM COATED ORAL DAILY
Qty: 90 TABLET | Refills: 3 | Status: SHIPPED | OUTPATIENT
Start: 2025-05-22

## 2025-05-22 SDOH — HEALTH STABILITY: PHYSICAL HEALTH: ON AVERAGE, HOW MANY MINUTES DO YOU ENGAGE IN EXERCISE AT THIS LEVEL?: 60 MIN

## 2025-05-22 SDOH — HEALTH STABILITY: PHYSICAL HEALTH: ON AVERAGE, HOW MANY DAYS PER WEEK DO YOU ENGAGE IN MODERATE TO STRENUOUS EXERCISE (LIKE A BRISK WALK)?: 7 DAYS

## 2025-05-22 ASSESSMENT — PAIN SCALES - GENERAL: PAINLEVEL_OUTOF10: NO PAIN (0)

## 2025-05-22 ASSESSMENT — SOCIAL DETERMINANTS OF HEALTH (SDOH): HOW OFTEN DO YOU GET TOGETHER WITH FRIENDS OR RELATIVES?: TWICE A WEEK

## 2025-05-22 NOTE — PROGRESS NOTES
Preventive Care Visit  [facility]  Deborah Kuhn PA-C, Family Medicine  May 22, 2025      Assessment & Plan     Routine history and physical examination of adult  Discussed updating colon cancer screening, patient declined.  Otherwise up-to-date on preventative screenings.  Labs as below.  Will consider zoster vaccine in the future, otherwise up-to-date on immunizations.  - CBC and Differential; Future  - Basic Metabolic Panel; Future  - Hemoglobin A1c; Future  - Lipid Panel; Future    Colon cancer screening declined    Anxiety  Menopausal syndrome (hot flashes)  Chronic, had been stable with 10 mg of fluoxetine once daily.  Due to increased menopausal hot flashes discussed slight increase in dose to see if we can get better coverage of menopausal symptoms as well.  Updated prescription as below. If minimal response could consider transitioning to Effexor or management of hot flashes and anxiety. Consider OTC ashwaganda.   - FLUoxetine 20 MG tablet; Take 1 tablet (20 mg) by mouth daily.          Counseling  Appropriate preventive services were addressed with this patient via screening, questionnaire, or discussion as appropriate for fall prevention, nutrition, physical activity, Tobacco-use cessation, social engagement, weight loss and cognition.  Checklist reviewing preventive services available has been given to the patient.  Reviewed patient's diet, addressing concerns and/or questions.   She is at risk for psychosocial distress and has been provided with information to reduce risk.       Jeannie Parisi is a 54 year old, presenting for the following:  Physical        5/22/2025     7:35 AM   Additional Questions   Roomed by Olga Pizarro LPN   Accompanied by self          HPI  Here for annual physical.  He is on fluoxetine 10 mg once daily with overall good control of anxiety.  More recently has been noticing progressive night sweats, hot flashes, difficulties with sleeping.  She  thinks she may be menopausal.  She had an ablation done when she was 34 so has not had menstrual bleeding since then.  Otherwise has been doing okay.      Contraception: Ablation  Risk for STI?: No  Last pap: 03/2024 NIL HPV-  Any hx of abnormal paps:  No  FH of early CA?: Father, lung cancer; paternal grandmother breast cancer at 39, two paternal aunts with breast cancer  Cholesterol/DM concerns/screening: Due  Tobacco?: No  Calcium intake: Dietary  DEXA: NA  Last mammo: 11/2024 MRI, follow up Left diagnostic MRI and US showing benign cysts  Colonoscopy: Due for first screening  Hepatitis C screen: Low risk, declines   HIV screen: Low risk, declines    Immunizations: COVID, Hep B, Prevnar, Zoster    Advance Care Planning    Discussed advance care planning with patient; however, patient declined at this time.        5/22/2025   General Health   How would you rate your overall physical health? Good   Feel stress (tense, anxious, or unable to sleep) Very much   (!) STRESS CONCERN      5/22/2025   Nutrition   Three or more servings of calcium each day? Yes   Diet: Regular (no restrictions)   How many servings of fruit and vegetables per day? (!) 2-3   How many sweetened beverages each day? (!) 2         5/22/2025   Exercise   Days per week of moderate/strenous exercise 7 days   Average minutes spent exercising at this level 60 min         5/22/2025   Social Factors   Frequency of gathering with friends or relatives Twice a week   Worry food won't last until get money to buy more No   Food not last or not have enough money for food? No   Do you have housing? (Housing is defined as stable permanent housing and does not include staying outside in a car, in a tent, in an abandoned building, in an overnight shelter, or couch-surfing.) No   Are you worried about losing your housing? No   Lack of transportation? No   Unable to get utilities (heat,electricity)? No   Want help with housing or utility concern? No   (!) HOUSING  CONCERN PRESENT      5/22/2025   Fall Risk   Fallen 2 or more times in the past year? No   Trouble with walking or balance? No          5/22/2025   Dental   Dentist two times every year? Yes         Today's PHQ-2 Score:       5/22/2025     7:09 AM   PHQ-2 ( 1999 [employer])   Q1: Little interest or pleasure in doing things 0   Q2: Feeling down, depressed or hopeless 0   PHQ-2 Score 0    Q1: Little interest or pleasure in doing things Not at all   Q2: Feeling down, depressed or hopeless Not at all   PHQ-2 Score 0       Patient-reported           5/22/2025   Substance Use   Alcohol more than 3/day or more than 7/wk No   Do you use any other substances recreationally? No     Social History     Tobacco Use    Smoking status: Never     Passive exposure: Never    Smokeless tobacco: Never   Vaping Use    Vaping status: Never Used   Substance Use Topics    Alcohol use: No    Drug use: No     Comment: Drug use: No           11/7/2024   LAST FHS-7 RESULTS   1st degree relative breast or ovarian cancer Yes   Any relative bilateral breast cancer No   Any male have breast cancer No   Any ONE woman have BOTH breast AND ovarian cancer No   Any woman with breast cancer before 50yrs No   2 or more relatives with breast AND/OR ovarian cancer Yes   2 or more relatives with breast AND/OR bowel cancer Yes        Mammogram Screening - Mammogram every 1-2 years updated in Health Maintenance based on mutual decision making          5/22/2025   One time HIV Screening   Previous HIV test? No         5/22/2025   STI Screening   New sexual partner(s) since last STI/HIV test? No     History of abnormal Pap smear: Yes - MARIN 2/3 on biopsy - PAP/HPV at 6 months. If negative, then cotest annually X 3 years. If all negative, then cotest every 3 years X at least 25 years        Latest Ref Rng & Units 3/12/2024    10:40 AM   PAP / HPV   PAP  Negative for Intraepithelial Lesion or Malignancy (NILM)    HPV 16 DNA Negative Negative    HPV 18 DNA Negative  "Negative    Other HR HPV Negative Negative      ASCVD Risk   The 10-year ASCVD risk score (Aaron JASSO, et al., 2019) is: 0.6%    Values used to calculate the score:      Age: 54 years      Sex: Female      Is Non- : No      Diabetic: No      Tobacco smoker: No      Systolic Blood Pressure: 96 mmHg      Is BP treated: No      HDL Cholesterol: 71 mg/dL      Total Cholesterol: 179 mg/dL         Reviewed and updated as needed this visit by Provider                    Past Medical History:   Diagnosis Date    Personal history of other medical treatment (CODE)      with two vaginal deliveries     Past Surgical History:   Procedure Laterality Date    EXTRACTION(S) DENTAL      No Comments Provided    LAPAROSCOPIC ABLATION ENDOMETRIOSIS      ,Endometrial Ablation    OTHER SURGICAL HISTORY      ,OTHER     OB History   No obstetric history on file.            Objective    Exam  BP 96/60   Pulse 78   Temp 98.1  F (36.7  C) (Tympanic)   Resp 18   Wt 53.5 kg (118 lb)   SpO2 98%   BMI 18.62 kg/m     Estimated body mass index is 18.62 kg/m  as calculated from the following:    Height as of 24: 1.695 m (5' 6.75\").    Weight as of this encounter: 53.5 kg (118 lb).    Physical Exam  GENERAL: alert and no distress  EYES: Eyes grossly normal to inspection, PERRL and conjunctivae and sclerae normal  HENT: ear canals and TM's normal, nose and mouth without ulcers or lesions  RESP: lungs clear to auscultation - no rales, rhonchi or wheezes  CV: regular rate and rhythm, normal S1 S2, no S3 or S4, no murmur, click or rub, no peripheral edema  SKIN: no suspicious lesions or rashes  PSYCH: mentation appears normal, affect normal/bright        Signed Electronically by: Deborah Kuhn PA-C    "

## 2025-05-22 NOTE — NURSING NOTE
"Chief Complaint   Patient presents with    Physical       Initial BP 96/60   Pulse 78   Temp 98.1  F (36.7  C) (Tympanic)   Resp 18   Wt 53.5 kg (118 lb)   SpO2 98%   BMI 18.62 kg/m   Estimated body mass index is 18.62 kg/m  as calculated from the following:    Height as of 4/8/24: 1.695 m (5' 6.75\").    Weight as of this encounter: 53.5 kg (118 lb).  Medication Review: complete    The next two questions are to help us understand your food security.  If you are feeling you need any assistance in this area, we have resources available to support you today.          5/22/2025   SDOH- Food Insecurity   Within the past 12 months, did you worry that your food would run out before you got money to buy more? N   Within the past 12 months, did the food you bought just not last and you didn t have money to get more? N         Health Care Directive:  Patient does not have a Health Care Directive: Discussed advance care planning with patient; however, patient declined at this time.    Olga Pizarro LPN      "

## 2025-06-23 NOTE — PATIENT INSTRUCTIONS
How to Take Your Medication Before Surgery  Preoperative Medication Instructions   Antiplatelet or Anticoagulation Medication Instructions   - We reviewed the medication list and the patient is not on an antiplatelet or anticoagulation medications.    Additional Medication Instructions   - Herbal medications and vitamins: DO NOT TAKE 14 days prior to surgery.   - ibuprofen (Advil, Motrin): DO NOT TAKE 1 day before surgery.    - SSRIs, SNRIs, TCAs, Antipsychotics: Continue without modification.        Patient Education   Preparing for Your Surgery  For Adults  Getting started  In most cases, a nurse will call to review your health history and instructions. They will give you an arrival time based on your scheduled surgery time. Please be ready to share:  Your doctor's clinic name and phone number  Your medical, surgical, and anesthesia history  A list of allergies and sensitivities  A list of medicines, including herbal treatments and over-the-counter drugs  Whether the patient has a legal guardian (ask how to send us the papers in advance)  Note: You may not receive a call if you were seen at our PAC (Preoperative Assessment Center).  Please tell us if you're pregnant--or if there's any chance you might be pregnant. Some surgeries may injure a fetus (unborn baby), so they require a pregnancy test. Surgeries that are safe for a fetus don't always need a test, and you can choose whether to have one.   Preparing for surgery  Within 10 to 30 days of surgery: Have a pre-op exam (sometimes called an H&P, or History and Physical). This can be done at a clinic or pre-operative center.  If you're having a , you may not need this exam. Talk to your care team.  At your pre-op exam, talk to your care team about all medicines you take. (This includes CBD oil and any drugs, such as THC, marijuana, and other forms of cannabis.) If you need to stop any medicine before surgery, ask when to start taking it again.  This is  for your safety. Many medicines and drugs can make you bleed too much during surgery. Some change how well surgery (anesthesia) drugs work.  Call your insurance company to let them know you're having surgery. (If you don't have insurance, call [phone].)  Call your clinic if there's any change in your health. This includes a scrape or scratch near the surgery site, or any signs of a cold (sore throat, runny nose, cough, rash, fever).  Eating and drinking guidelines  For your safety: Unless your surgeon tells you otherwise, follow the guidelines below.  Eat and drink as normal until 8 hours before you arrive for surgery. After that, no food or milk. You can spit out gum when you arrive.  Drink clear liquids until 2 hours before you arrive. These are liquids you can see through, like water, Gatorade, and Propel Water. They also include plain black coffee and tea (no cream or milk).  No alcohol for 24 hours before you arrive. The night before surgery, stop any drinks that contain THC.  If your care team tells you to take medicine on the morning of surgery, it's okay to take it with a sip of water. No other medicines or drugs are allowed (including CBD oil)--follow your care team's instructions.  If you have questions the day of surgery, call your hospital or surgery center.   Preventing infection  Shower or bathe the night before and the morning of surgery. Follow the instructions your clinic gave you. (If no instructions, use regular soap.)  Don't shave or clip hair near your surgery site. We'll remove the hair if needed.  Don't smoke or vape the morning of surgery. No chewing tobacco for 6 hours before you arrive. A nicotine patch is okay. You may spit out nicotine gum when you arrive.  For some surgeries, the surgeon will tell you to fully quit smoking and nicotine.  We will make every effort to keep you safe from infection. We will:  Clean our hands often with soap and water (or an alcohol-based hand  rub).  Clean the skin at your surgery site with a special soap that kills germs.  Give you a special gown to keep you warm. (Cold raises the risk of infection.)  Wear hair covers, masks, gowns, and gloves during surgery.  Give antibiotic medicine, if prescribed. Not all surgeries need this medicine.  What to bring on the day of surgery  Photo ID and insurance card  Copy of your health care directive, if you have one  Glasses and hearing aids (bring cases)  You can't wear contacts during surgery  Inhaler and eye drops, if you use them (tell us about these when you arrive)  CPAP machine or breathing device, if you use them  A few personal items, if spending the night  If you have . . .  A pacemaker, ICD (cardiac defibrillator), or other implant: Bring the ID card.  An implanted stimulator: Bring the remote control.  A legal guardian: Bring a copy of the certified (court-stamped) guardianship papers.  Please remove any jewelry, including body piercings. Leave jewelry and other valuables at home.  If you're going home the day of surgery  You must have a support person drive you home. They should stay with you overnight, and they may need to help with your self-care.  If you don't have a support person, please tells us as soon as possible. We can help.  After surgery  If it's hard to control your pain or you need more pain medicine, please call your surgeon's office.  Questions?   If you have any questions for your care team, list them here:   ____________________________________________________________________________________________________________________________________________________________________________________________________________________________________________________________  For informational purposes only. Not to replace the advice of your health care provider. Copyright   2003, 2019 [facility]. All rights reserved. Clinically reviewed by Shashi Stacy MD. [employer] [id number] - REV  02/25.

## 2025-06-24 ENCOUNTER — OFFICE VISIT (OUTPATIENT)
Dept: FAMILY MEDICINE | Facility: OTHER | Age: 55
End: 2025-06-24
Payer: COMMERCIAL

## 2025-06-24 ENCOUNTER — RESULTS FOLLOW-UP (OUTPATIENT)
Dept: FAMILY MEDICINE | Facility: OTHER | Age: 55
End: 2025-06-24

## 2025-06-24 VITALS
OXYGEN SATURATION: 99 % | WEIGHT: 117 LBS | RESPIRATION RATE: 16 BRPM | HEART RATE: 76 BPM | TEMPERATURE: 97.4 F | BODY MASS INDEX: 18.36 KG/M2 | HEIGHT: 67 IN | DIASTOLIC BLOOD PRESSURE: 70 MMHG | SYSTOLIC BLOOD PRESSURE: 110 MMHG

## 2025-06-24 DIAGNOSIS — R61 NIGHT SWEATS: ICD-10-CM

## 2025-06-24 DIAGNOSIS — Z01.818 PREOP GENERAL PHYSICAL EXAM: ICD-10-CM

## 2025-06-24 DIAGNOSIS — Z01.818 PRE-OPERATIVE GENERAL PHYSICAL EXAMINATION: Primary | ICD-10-CM

## 2025-06-24 DIAGNOSIS — Z01.89 PATIENT REQUEST FOR DIAGNOSTIC TESTING: ICD-10-CM

## 2025-06-24 LAB
ANION GAP SERPL CALCULATED.3IONS-SCNC: 11 MMOL/L (ref 7–15)
ATRIAL RATE - MUSE: 67 BPM
BASOPHILS # BLD AUTO: 0.1 10E3/UL (ref 0–0.2)
BASOPHILS NFR BLD AUTO: 1 %
BUN SERPL-MCNC: 10.6 MG/DL (ref 6–20)
CALCIUM SERPL-MCNC: 9.3 MG/DL (ref 8.8–10.4)
CHLORIDE SERPL-SCNC: 103 MMOL/L (ref 98–107)
CHOLEST SERPL-MCNC: 174 MG/DL
CREAT SERPL-MCNC: 0.6 MG/DL (ref 0.51–0.95)
DIASTOLIC BLOOD PRESSURE - MUSE: NORMAL MMHG
EGFRCR SERPLBLD CKD-EPI 2021: >90 ML/MIN/1.73M2
EOSINOPHIL # BLD AUTO: 0.2 10E3/UL (ref 0–0.7)
EOSINOPHIL NFR BLD AUTO: 3 %
ERYTHROCYTE [DISTWIDTH] IN BLOOD BY AUTOMATED COUNT: 13.1 % (ref 10–15)
EST. AVERAGE GLUCOSE BLD GHB EST-MCNC: 111 MG/DL
ESTRADIOL SERPL-MCNC: 45 PG/ML
FASTING STATUS PATIENT QL REPORTED: ABNORMAL
FASTING STATUS PATIENT QL REPORTED: NORMAL
FSH SERPL IRP2-ACNC: 77.8 MIU/ML
GLUCOSE SERPL-MCNC: 96 MG/DL (ref 70–99)
HBA1C MFR BLD: 5.5 %
HCO3 SERPL-SCNC: 26 MMOL/L (ref 22–29)
HCT VFR BLD AUTO: 41.7 % (ref 35–47)
HDLC SERPL-MCNC: 65 MG/DL
HGB BLD-MCNC: 13.8 G/DL (ref 11.7–15.7)
IMM GRANULOCYTES # BLD: 0 10E3/UL
IMM GRANULOCYTES NFR BLD: 0 %
INTERPRETATION ECG - MUSE: NORMAL
LDLC SERPL CALC-MCNC: 100 MG/DL
LH SERPL-ACNC: 34.2 MIU/ML
LYMPHOCYTES # BLD AUTO: 1 10E3/UL (ref 0.8–5.3)
LYMPHOCYTES NFR BLD AUTO: 21 %
MCH RBC QN AUTO: 32.8 PG (ref 26.5–33)
MCHC RBC AUTO-ENTMCNC: 33.1 G/DL (ref 31.5–36.5)
MCV RBC AUTO: 99 FL (ref 78–100)
MONOCYTES # BLD AUTO: 0.4 10E3/UL (ref 0–1.3)
MONOCYTES NFR BLD AUTO: 8 %
NEUTROPHILS # BLD AUTO: 3.2 10E3/UL (ref 1.6–8.3)
NEUTROPHILS NFR BLD AUTO: 67 %
NONHDLC SERPL-MCNC: 109 MG/DL
NRBC # BLD AUTO: 0 10E3/UL
NRBC BLD AUTO-RTO: 0 /100
P AXIS - MUSE: 70 DEGREES
PLATELET # BLD AUTO: 205 10E3/UL (ref 150–450)
POTASSIUM SERPL-SCNC: 4.1 MMOL/L (ref 3.4–5.3)
PR INTERVAL - MUSE: 148 MS
QRS DURATION - MUSE: 78 MS
QT - MUSE: 426 MS
QTC - MUSE: 450 MS
R AXIS - MUSE: 96 DEGREES
RBC # BLD AUTO: 4.21 10E6/UL (ref 3.8–5.2)
SODIUM SERPL-SCNC: 140 MMOL/L (ref 135–145)
SYSTOLIC BLOOD PRESSURE - MUSE: NORMAL MMHG
T AXIS - MUSE: 77 DEGREES
TRIGL SERPL-MCNC: 47 MG/DL
TSH SERPL DL<=0.005 MIU/L-ACNC: 0.96 UIU/ML (ref 0.3–4.2)
VENTRICULAR RATE- MUSE: 67 BPM
WBC # BLD AUTO: 4.8 10E3/UL (ref 4–11)

## 2025-06-24 PROCEDURE — 84443 ASSAY THYROID STIM HORMONE: CPT | Mod: ZL

## 2025-06-24 PROCEDURE — 83002 ASSAY OF GONADOTROPIN (LH): CPT | Mod: ZL

## 2025-06-24 PROCEDURE — 36415 COLL VENOUS BLD VENIPUNCTURE: CPT | Mod: ZL

## 2025-06-24 PROCEDURE — 85004 AUTOMATED DIFF WBC COUNT: CPT | Mod: ZL

## 2025-06-24 PROCEDURE — 83001 ASSAY OF GONADOTROPIN (FSH): CPT | Mod: ZL

## 2025-06-24 PROCEDURE — 83036 HEMOGLOBIN GLYCOSYLATED A1C: CPT | Mod: ZL

## 2025-06-24 PROCEDURE — 80048 BASIC METABOLIC PNL TOTAL CA: CPT | Mod: ZL

## 2025-06-24 PROCEDURE — 82670 ASSAY OF TOTAL ESTRADIOL: CPT | Mod: ZL

## 2025-06-24 PROCEDURE — 82465 ASSAY BLD/SERUM CHOLESTEROL: CPT | Mod: ZL

## 2025-06-24 ASSESSMENT — PAIN SCALES - GENERAL: PAINLEVEL_OUTOF10: NO PAIN (0)

## 2025-06-24 NOTE — PROGRESS NOTES
Preoperative Evaluation  [facility] AND [facility]  [address]  [place] [address]  Phone: [phone]  Fax: [phone]  Primary Provider: Deborah Kuhn PA-C  Pre-op Performing Provider: LOUIS MYERS CNP  Jun 24, 2025 6/23/2025   Surgical Information   What procedure is being done? Breast augmentation   Facility or Hospital where procedure/surgery will be performed: [facility]   Who is doing the procedure / surgery? Dr. Jurado   Date of surgery / procedure: July 7th   Time of surgery / procedure: 11:45am   Where do you plan to recover after surgery? at home with family     Fax number for surgical facility: [phone]    Assessment & Plan     The proposed surgical procedure is considered INTERMEDIATE risk.    Problem List Items Addressed This Visit    None  Visit Diagnoses         Pre-operative general physical examination    -  Primary    Relevant Orders    EKG 12-lead, tracing only (Same Day) (Completed)      Night sweats        Relevant Orders    TSH Reflex GH (Completed)    FSH (Completed)    Estradiol (Completed)    Luteinizing Hormone (Completed)      Patient request for diagnostic testing        Relevant Orders    TSH Reflex GH (Completed)    FSH (Completed)    Estradiol (Completed)    Luteinizing Hormone (Completed)      Routine history and physical examination of adult              Patient presents today for a preoperative physical exam. She notes that she will undergo breast augmentation on 7/7/25. She denies any concerning symptoms today, no CP, dyspnea, palpitations. On exam VSS, bilateral breath sounds clear. EKG shows sinus rhythm, stable from previous. She has labs pended through PCP which included a CBC and BMP which were stable, no anemia, renal function was within normal limits.    Patient notes that she has history of ablation so has had amenorrhea since then but has been having nocturnal hot flashes for over a year and would like to  have labs check to correlate with menopause. With her ablation we did opt to check FSH which was above 70. Estradiol at 45. LH at 34. TSH normal. Likely related to menopause. She is not interested in HRT.  She is on selective serotonin reuptake inhibitor which may help with the vasomotor symptoms she is experiencing.      - No identified additional risk factors other than previously addressed    Preoperative Medication Instructions  Antiplatelet or Anticoagulation Medication Instructions   - We reviewed the medication list and the patient is not on an antiplatelet or anticoagulation medications.    Additional Medication Instructions   - Herbal medications and vitamins: DO NOT TAKE 14 days prior to surgery.   - ibuprofen (Advil, Motrin): DO NOT TAKE before surgery as advised per surgeon's office.    - SSRIs, SNRIs, TCAs, Antipsychotics: Continue without modification.     Recommendation  Approval given to proceed with proposed procedure, without further diagnostic evaluation.    Jeannie Parisi is a 54 year old, presenting for the following:  Pre-Op Exam (Breast Augmentation  Date of Surgery 7/7/25 )          6/24/2025     8:28 AM   Additional Questions   Roomed by RICHIE Rodriguez     HPI: Patient presents for preoperative physical exam, she will be undergoing breast augmentation on July 7th, 2025. She reports that she is feeling well, denies any CP, dyspnea, or palpitations.     Patient notes that she has had menopausal symptoms of night sweats x 1 year. She has history of ablation so has had amenorrhea for several years. She is hoping to have lab evaluation since she is having blood work for preop today.           6/23/2025   Pre-Op Questionnaire   Have you ever had a heart attack or stroke? No   Have you ever had surgery on your heart or blood vessels, such as a stent placement, a coronary artery bypass, or surgery on an artery in your head, neck, heart, or legs? No   Do you have chest pain with activity? No   Do  you have a history of heart failure? No   Do you currently have a cold, bronchitis or symptoms of other infection? No   Do you have a cough, shortness of breath, or wheezing? No   Do you or anyone in your family have previous history of blood clots? No   Do you or does anyone in your family have a serious bleeding problem such as prolonged bleeding following surgeries or cuts? No   Have you ever had problems with anemia or been told to take iron pills? No   Have you had any abnormal blood loss such as black, tarry or bloody stools, or abnormal vaginal bleeding? No   Have you ever had a blood transfusion? No   Are you willing to have a blood transfusion if it is medically needed before, during, or after your surgery? Yes   Have you or any of your relatives ever had problems with anesthesia? No   Do you have sleep apnea, excessive snoring or daytime drowsiness? No   Do you have any artifical heart valves or other implanted medical devices like a pacemaker, defibrillator, or continuous glucose monitor? No   Do you have artificial joints? No   Are you allergic to latex? No     Advance Care Planning    No ACP on file.     Preoperative Review of    reviewed - no record of controlled substances prescribed.      Status of Chronic Conditions:  Anxiety stable.     Patient Active Problem List    Diagnosis Date Noted    Allergic state 2018     Priority: Medium    Onychomycosis 2016     Priority: Medium      Past Medical History:   Diagnosis Date    Personal history of other medical treatment (CODE)      with two vaginal deliveries     Past Surgical History:   Procedure Laterality Date    EXTRACTION(S) DENTAL      No Comments Provided    LAPAROSCOPIC ABLATION ENDOMETRIOSIS      ,Endometrial Ablation    OTHER SURGICAL HISTORY      ,744708,OTHER     Current Outpatient Medications   Medication Sig Dispense Refill    FLUoxetine (PROZAC) 10 MG tablet Take 1 tablet (10 mg) by mouth daily. (Patient not  "taking: Reported on 6/24/2025) 90 tablet 3    FLUoxetine 20 MG tablet Take 1 tablet (20 mg) by mouth daily. 90 tablet 3       Allergies   Allergen Reactions    Seasonal Allergies Other (See Comments)     Sneezing, watery eyes        Social History     Tobacco Use    Smoking status: Never     Passive exposure: Never    Smokeless tobacco: Never   Substance Use Topics    Alcohol use: No     Family History   Problem Relation Age of Onset    Family History Negative Mother         Good Health    Lung Cancer Father     Breast Cancer Paternal Grandmother 38    Breast Cancer Paternal Aunt     Breast Cancer Paternal Aunt     Ovarian Cancer Paternal Cousin      History   Drug Use No     Comment: Drug use: No             Review of Systems  Constitutional, neuro, ENT, endocrine, pulmonary, cardiac, gastrointestinal, genitourinary, musculoskeletal, integument and psychiatric systems are negative, except as otherwise noted.    Objective    /70 (BP Location: Right arm, Patient Position: Sitting, Cuff Size: Adult Regular)   Pulse 76   Temp 97.4  F (36.3  C) (Temporal)   Resp 16   Ht 1.705 m (5' 7.13\")   Wt 53.1 kg (117 lb)   SpO2 99%   BMI 18.26 kg/m     Estimated body mass index is 18.26 kg/m  as calculated from the following:    Height as of this encounter: 1.705 m (5' 7.13\").    Weight as of this encounter: 53.1 kg (117 lb).  Physical Exam  GENERAL: alert and no distress  EYES: Eyes grossly normal to inspection, PERRL and conjunctivae and sclerae normal  HENT: ear canals and TM's normal, nose and mouth without ulcers or lesions  NECK: no adenopathy, no asymmetry, masses, or scars  RESP: lungs clear to auscultation - no rales, rhonchi or wheezes  CV: regular rate and rhythm, normal S1 S2, no S3 or S4, no murmur, click or rub, no peripheral edema  ABDOMEN: soft, nontender, no hepatosplenomegaly, no masses and bowel sounds normal  MS: no gross musculoskeletal defects noted, no edema  SKIN: no suspicious lesions or " "rashes  NEURO: Normal strength and tone, mentation intact and speech normal  PSYCH: mentation appears normal, affect normal/bright    No results for input(s): \"HGB\", \"PLT\", \"INR\", \"NA\", \"POTASSIUM\", \"CR\", \"A1C\" in the last 8760 hours.     Diagnostics  Results for orders placed or performed in visit on 06/24/25   Basic Metabolic Panel     Status: None   Result Value Ref Range    Sodium 140 135 - 145 mmol/L    Potassium 4.1 3.4 - 5.3 mmol/L    Chloride 103 98 - 107 mmol/L    Carbon Dioxide (CO2) 26 22 - 29 mmol/L    Anion Gap 11 7 - 15 mmol/L    Urea Nitrogen 10.6 6.0 - 20.0 mg/dL    Creatinine 0.60 0.51 - 0.95 mg/dL    GFR Estimate >90 >60 mL/min/1.73m2    Calcium 9.3 8.8 - 10.4 mg/dL    Glucose 96 70 - 99 mg/dL    Patient Fasting > 8hrs? Unknown    Hemoglobin A1c     Status: Normal   Result Value Ref Range    Estimated Average Glucose 111 <117 mg/dL    Hemoglobin A1C 5.5 <5.7 %   Lipid Panel     Status: Abnormal   Result Value Ref Range    Cholesterol 174 <200 mg/dL    Triglycerides 47 <150 mg/dL    Direct Measure HDL 65 >=50 mg/dL    LDL Cholesterol Calculated 100 (H) <100 mg/dL    Non HDL Cholesterol 109 <130 mg/dL    Patient Fasting > 8hrs? Unknown     Narrative    Cholesterol  Desirable: < 200 mg/dL  Borderline High: 200 - 239 mg/dL  High: >= 240 mg/dL    Triglycerides  Normal: < 150 mg/dL  Borderline High: 150 - 199 mg/dL  High: 200-499 mg/dL  Very High: >= 500 mg/dL    Direct Measure HDL  Female: >= 50 mg/dL   Male: >= 40 mg/dL    LDL Cholesterol  Desirable: < 100 mg/dL  Above Desirable: 100 - 129 mg/dL   Borderline High: 130 - 159 mg/dL   High:  160 - 189 mg/dL   Very High: >= 190 mg/dL    Non HDL Cholesterol  Desirable: < 130 mg/dL  Above Desirable: 130 - 159 mg/dL  Borderline High: 160 - 189 mg/dL  High: 190 - 219 mg/dL  Very High: >= 220 mg/dL   TSH Reflex GH     Status: Normal   Result Value Ref Range    TSH 0.96 0.30 - 4.20 uIU/mL   FSH     Status: None   Result Value Ref Range    FSH 77.8 mIU/mL "   Estradiol     Status: None   Result Value Ref Range    Estradiol 45 pg/mL   Luteinizing Hormone     Status: None   Result Value Ref Range    Luteinizing Hormone 34.2 mIU/mL   CBC with platelets and differential     Status: None   Result Value Ref Range    WBC Count 4.8 4.0 - 11.0 10e3/uL    RBC Count 4.21 3.80 - 5.20 10e6/uL    Hemoglobin 13.8 11.7 - 15.7 g/dL    Hematocrit 41.7 35.0 - 47.0 %    MCV 99 78 - 100 fL    MCH 32.8 26.5 - 33.0 pg    MCHC 33.1 31.5 - 36.5 g/dL    RDW 13.1 10.0 - 15.0 %    Platelet Count 205 150 - 450 10e3/uL    % Neutrophils 67 %    % Lymphocytes 21 %    % Monocytes 8 %    % Eosinophils 3 %    % Basophils 1 %    % Immature Granulocytes 0 %    NRBCs per 100 WBC 0 <1 /100    Absolute Neutrophils 3.2 1.6 - 8.3 10e3/uL    Absolute Lymphocytes 1.0 0.8 - 5.3 10e3/uL    Absolute Monocytes 0.4 0.0 - 1.3 10e3/uL    Absolute Eosinophils 0.2 0.0 - 0.7 10e3/uL    Absolute Basophils 0.1 0.0 - 0.2 10e3/uL    Absolute Immature Granulocytes 0.0 <=0.4 10e3/uL    Absolute NRBCs 0.0 10e3/uL   EKG 12-lead, tracing only (Same Day)     Status: None (Preliminary result)   Result Value Ref Range    Systolic Blood Pressure  mmHg    Diastolic Blood Pressure  mmHg    Ventricular Rate 67 BPM    Atrial Rate 67 BPM    NE Interval 148 ms    QRS Duration 78 ms     ms    QTc 450 ms    P Axis 70 degrees    R AXIS 96 degrees    T Axis 77 degrees    Interpretation ECG       Sinus rhythm  Possible Left atrial enlargement  Rightward axis  Borderline ECG  When compared with ECG of 19-Feb-2024 15:17,  No significant change was found     CBC and Differential     Status: None    Narrative    The following orders were created for panel order CBC and Differential.  Procedure                               Abnormality         Status                     ---------                               -----------         ------                     CBC with platelets and ...[[id number]]                      Final result                  Please view results for these tests on the individual orders.       Revised Cardiac Risk Index (RCRI)  The patient has the following serious cardiovascular risks for perioperative complications:   - No serious cardiac risks = 0 points     RCRI Interpretation: 0 points: Class I (very low risk - 0.4% complication rate)     Signed Electronically by: LOUIS MYERS CNP  A copy of this evaluation report is provided to the requesting physician.

## 2025-06-27 ENCOUNTER — TELEPHONE (OUTPATIENT)
Dept: FAMILY MEDICINE | Facility: OTHER | Age: 55
End: 2025-06-27
Payer: COMMERCIAL

## 2025-06-27 NOTE — TELEPHONE ENCOUNTER
Per Patient [facility] did not receive her preop and they need that for upcoming surgery.      Paige Banuelos on 6/27/2025 at 11:15 AM

## (undated) RX ORDER — LIDOCAINE HYDROCHLORIDE 10 MG/ML
INJECTION, SOLUTION INFILTRATION; PERINEURAL
Status: DISPENSED
Start: 2018-03-07